# Patient Record
Sex: FEMALE | Race: WHITE | NOT HISPANIC OR LATINO | Employment: OTHER | URBAN - METROPOLITAN AREA
[De-identification: names, ages, dates, MRNs, and addresses within clinical notes are randomized per-mention and may not be internally consistent; named-entity substitution may affect disease eponyms.]

---

## 2017-10-05 PROBLEM — M81.0 AGE-RELATED OSTEOPOROSIS WITHOUT CURRENT PATHOLOGICAL FRACTURE: Status: ACTIVE | Noted: 2017-10-05

## 2017-10-24 PROBLEM — Z12.11 COLON CANCER SCREENING: Status: ACTIVE | Noted: 2017-10-24

## 2018-04-25 ENCOUNTER — ANESTHESIA EVENT (OUTPATIENT)
Dept: SURGERY | Facility: OTHER | Age: 68
DRG: 470 | End: 2018-04-25
Payer: MEDICARE

## 2018-04-25 ENCOUNTER — HOSPITAL ENCOUNTER (OUTPATIENT)
Dept: PREADMISSION TESTING | Facility: OTHER | Age: 68
Discharge: HOME | End: 2018-04-25
Attending: ORTHOPAEDIC SURGERY
Payer: MEDICARE

## 2018-04-25 VITALS
SYSTOLIC BLOOD PRESSURE: 164 MMHG | OXYGEN SATURATION: 99 % | WEIGHT: 159 LBS | BODY MASS INDEX: 30.02 KG/M2 | TEMPERATURE: 97 F | HEART RATE: 64 BPM | HEIGHT: 61 IN | DIASTOLIC BLOOD PRESSURE: 80 MMHG

## 2018-04-25 DIAGNOSIS — M17.11 PRIMARY OSTEOARTHRITIS OF RIGHT KNEE: Primary | ICD-10-CM

## 2018-04-25 LAB
ABO + RH BLD: NORMAL
ALBUMIN SERPL BCP-MCNC: 3.7 G/DL
ALP SERPL-CCNC: 100 U/L
ALT SERPL W/O P-5'-P-CCNC: 18 U/L
ANION GAP SERPL CALC-SCNC: 12 MMOL/L
AST SERPL-CCNC: 27 U/L
BACTERIA #/AREA URNS HPF: ABNORMAL /HPF
BASOPHILS # BLD AUTO: 0.03 K/UL
BASOPHILS NFR BLD: 0.3 %
BILIRUB SERPL-MCNC: 0.7 MG/DL
BILIRUB UR QL STRIP: ABNORMAL
BLD GP AB SCN CELLS X3 SERPL QL: NORMAL
BUN SERPL-MCNC: 17 MG/DL
CALCIUM SERPL-MCNC: 10.1 MG/DL
CHLORIDE SERPL-SCNC: 104 MMOL/L
CLARITY UR: ABNORMAL
CO2 SERPL-SCNC: 24 MMOL/L
COLOR UR: YELLOW
CREAT SERPL-MCNC: 1 MG/DL
DIFFERENTIAL METHOD: ABNORMAL
EOSINOPHIL # BLD AUTO: 0.2 K/UL
EOSINOPHIL NFR BLD: 1.8 %
ERYTHROCYTE [DISTWIDTH] IN BLOOD BY AUTOMATED COUNT: 17.1 %
EST. GFR  (AFRICAN AMERICAN): >60 ML/MIN/1.73 M^2
EST. GFR  (NON AFRICAN AMERICAN): 58 ML/MIN/1.73 M^2
GLUCOSE SERPL-MCNC: 83 MG/DL
GLUCOSE UR QL STRIP: NEGATIVE
HCT VFR BLD AUTO: 38.4 %
HGB BLD-MCNC: 11.7 G/DL
HGB UR QL STRIP: ABNORMAL
HYALINE CASTS #/AREA URNS LPF: 0 /LPF
KETONES UR QL STRIP: ABNORMAL
LEUKOCYTE ESTERASE UR QL STRIP: ABNORMAL
LYMPHOCYTES # BLD AUTO: 2.6 K/UL
LYMPHOCYTES NFR BLD: 24.3 %
MCH RBC QN AUTO: 25.3 PG
MCHC RBC AUTO-ENTMCNC: 30.5 G/DL
MCV RBC AUTO: 83 FL
MICROSCOPIC COMMENT: ABNORMAL
MONOCYTES # BLD AUTO: 0.7 K/UL
MONOCYTES NFR BLD: 6.5 %
NEUTROPHILS # BLD AUTO: 7.2 K/UL
NEUTROPHILS NFR BLD: 66.9 %
NITRITE UR QL STRIP: NEGATIVE
PH UR STRIP: 6 [PH] (ref 5–8)
PLATELET # BLD AUTO: 375 K/UL
PMV BLD AUTO: 10.1 FL
POTASSIUM SERPL-SCNC: 4.1 MMOL/L
PROT SERPL-MCNC: 7.9 G/DL
PROT UR QL STRIP: ABNORMAL
RBC # BLD AUTO: 4.63 M/UL
RBC #/AREA URNS HPF: 20 /HPF (ref 0–4)
SODIUM SERPL-SCNC: 140 MMOL/L
SP GR UR STRIP: >=1.03 (ref 1–1.03)
SQUAMOUS #/AREA URNS HPF: 3 /HPF
URN SPEC COLLECT METH UR: ABNORMAL
UROBILINOGEN UR STRIP-ACNC: NEGATIVE EU/DL
WBC # BLD AUTO: 10.81 K/UL
WBC #/AREA URNS HPF: >100 /HPF (ref 0–5)

## 2018-04-25 PROCEDURE — 87088 URINE BACTERIA CULTURE: CPT

## 2018-04-25 PROCEDURE — 87086 URINE CULTURE/COLONY COUNT: CPT

## 2018-04-25 PROCEDURE — 87077 CULTURE AEROBIC IDENTIFY: CPT

## 2018-04-25 PROCEDURE — 85025 COMPLETE CBC W/AUTO DIFF WBC: CPT

## 2018-04-25 PROCEDURE — 36415 COLL VENOUS BLD VENIPUNCTURE: CPT

## 2018-04-25 PROCEDURE — 87186 SC STD MICRODIL/AGAR DIL: CPT

## 2018-04-25 PROCEDURE — 86901 BLOOD TYPING SEROLOGIC RH(D): CPT

## 2018-04-25 PROCEDURE — 81000 URINALYSIS NONAUTO W/SCOPE: CPT

## 2018-04-25 PROCEDURE — 80053 COMPREHEN METABOLIC PANEL: CPT

## 2018-04-25 RX ORDER — SODIUM CHLORIDE, SODIUM LACTATE, POTASSIUM CHLORIDE, CALCIUM CHLORIDE 600; 310; 30; 20 MG/100ML; MG/100ML; MG/100ML; MG/100ML
INJECTION, SOLUTION INTRAVENOUS CONTINUOUS
Status: CANCELLED | OUTPATIENT
Start: 2018-04-25

## 2018-04-25 RX ORDER — BISOPROLOL FUMARATE 10 MG/1
10 TABLET, FILM COATED ORAL DAILY
COMMUNITY
End: 2020-01-10

## 2018-04-25 RX ORDER — VIT C/E/ZN/COPPR/LUTEIN/ZEAXAN 250MG-90MG
1000 CAPSULE ORAL DAILY
COMMUNITY

## 2018-04-25 RX ORDER — FAMOTIDINE 20 MG/1
20 TABLET, FILM COATED ORAL
Status: CANCELLED | OUTPATIENT
Start: 2018-04-25 | End: 2018-04-25

## 2018-04-25 RX ORDER — PREGABALIN 75 MG/1
150 CAPSULE ORAL
Status: DISCONTINUED | OUTPATIENT
Start: 2018-04-25 | End: 2018-04-26 | Stop reason: HOSPADM

## 2018-04-25 NOTE — ANESTHESIA PREPROCEDURE EVALUATION
04/25/2018  Cheli Moe is a 67 y.o., female.    Anesthesia Evaluation    I have reviewed the Patient Summary Reports.    I have reviewed the Nursing Notes.   I have reviewed the Medications.     Review of Systems  Anesthesia Hx:  PONV  Denies Family Hx of Anesthesia complications.  Personal Hx of Anesthesia complications, Post-Operative Nausea/Vomiting, with every anesthetic, treatment not known   Social:  Non-Smoker    Hematology/Oncology:  Hematology Normal   Oncology Normal     Cardiovascular:   Hypertension, well controlled    Pulmonary:  Pulmonary Normal    Hepatic/GI:  Hepatic/GI Normal    Musculoskeletal:   Arthritis     Neurological:  Neurology Normal    Endocrine:  Endocrine Normal        Physical Exam  General:  Well nourished    Airway/Jaw/Neck:  Airway Findings: Mouth Opening: Normal Tongue: Normal  General Airway Assessment: Adult  Mallampati: II  TM Distance: Normal, at least 6 cm      Dental:  Dental Findings: In tact             Anesthesia Plan  Type of Anesthesia, risks & benefits discussed:  Anesthesia Type:  spinal  Patient's Preference:   Intra-op Monitoring Plan: standard ASA monitors  Intra-op Monitoring Plan Comments:   Post Op Pain Control Plan:   Post Op Pain Control Plan Comments:   Induction:    Beta Blocker:         Informed Consent: Patient understands risks and agrees with Anesthesia plan.  Questions answered. Anesthesia consent signed with patient.  ASA Score: 3     Day of Surgery Review of History & Physical:    H&P update referred to the surgeon.         Ready For Surgery From Anesthesia Perspective.

## 2018-04-25 NOTE — DISCHARGE INSTRUCTIONS
PRE-ADMIT TESTING -  [phone]    [address]  [place] [facility]          Your surgery has been scheduled at [facility]. We are pleased to have the opportunity to serve you. For Further Information please call [phone].    On the day of surgery please report to the Information Desk on the 1st floor.    · CONTACT YOUR PHYSICIAN'S OFFICE THE DAY PRIOR TO YOUR SURGERY TO OBTAIN YOUR ARRIVAL TIME.     · The evening before surgery do not eat anything after 9 p.m. ( this includes hard candy, chewing gum and mints).  You may only have GATORADE, POWERADE AND WATER  from 9 p.m. until you leave your home.   DO NOT DRINK ANY LIQUIDS ON THE WAY TO THE HOSPITAL.      SPECIAL MEDICATION INSTRUCTIONS: TAKE medications checked off by the Anesthesiologist on your Medication List.    Angiogram Patients: Take medications as instructed by your physician, including aspirin.     Surgery Patients:    If you take ASPIRIN - Your PHYSICIAN/SURGEON will need to inform you IF/OR when you need to stop taking aspirin prior to your surgery.     Do Not take any medications containing IBUPROFEN.  Do Not Wear any make-up or dark nail polish   (especially eye make-up) to surgery. If you come to surgery with makeup on you will be required to remove the makeup or nail polish.    Do not shave your surgical area at least 5 days prior to your surgery. The surgical prep will be performed at the hospital according to Infection Control regulations.    Leave all valuables at home.   Do Not wear any jewelry or watches, including any metal in body piercings.  Contact Lens must be removed before surgery. Either do not wear the contact lens or bring a case and solution for storage.  Please bring a container for eyeglasses or dentures as required.  Bring any paperwork your physician has provided, such as consent forms,  history and physicals, doctor's orders, etc.   Bring comfortable clothes that are loose fitting to wear upon  discharge. Take into consideration the type of surgery being performed.  Maintain your diet as advised per your physician the day prior to surgery.      Adequate rest the night before surgery is advised.   Park in the Parking lot behind the hospital or in the [facility] across the street from the parking lot. Parking is complimentary.  If you will be discharged the same day as your procedure, please arrange for a responsible adult to drive you home or to accompany you if traveling by taxi.   YOU WILL NOT BE PERMITTED TO DRIVE OR TO LEAVE THE HOSPITAL ALONE AFTER SURGERY.   It is strongly recommended that you arrange for someone to remain with you for the first 24 hrs following your surgery.       Thank you for your cooperation.  The Staff of [facility].        Bathing Instructions                                                                 Please shower the evening before and morning of your procedure with    ANTIBACTERIAL SOAP. ( DIAL, etc )  Concentrate on the surgical area   for at least 3 minutes and rinse completely. Dry off as usual.   Do not use any deodorant, powder, body lotions, perfume, after shave or    cologne.

## 2018-04-27 LAB — BACTERIA UR CULT: NORMAL

## 2018-04-27 NOTE — PRE ADMISSION SCREENING
Left voicemail for Kelsey at Dr. Reaves's office regarding preliminary urine culture result, since patient surgery scheduled Monday, 4/30/18

## 2018-04-27 NOTE — PRE ADMISSION SCREENING
"Received outside cardiac preop risk assessment ("moderate risk") and PET stress result.  Kelsey notified.  Kelsey to contact patient to Rx antibiotics for urine result. Patient's  notified. Will proceed with surgery on 4/30/18.  "

## 2018-04-30 ENCOUNTER — ANESTHESIA (OUTPATIENT)
Dept: SURGERY | Facility: OTHER | Age: 68
DRG: 470 | End: 2018-04-30
Payer: MEDICARE

## 2018-04-30 ENCOUNTER — HOSPITAL ENCOUNTER (INPATIENT)
Facility: OTHER | Age: 68
LOS: 1 days | Discharge: HOME HEALTH | DRG: 470 | End: 2018-05-01
Attending: ORTHOPAEDIC SURGERY | Admitting: ORTHOPAEDIC SURGERY
Payer: MEDICARE

## 2018-04-30 DIAGNOSIS — M17.11 PRIMARY OSTEOARTHRITIS OF RIGHT KNEE: Primary | ICD-10-CM

## 2018-04-30 PROCEDURE — 27201423 OPTIME MED/SURG SUP & DEVICES STERILE SUPPLY: Performed by: ORTHOPAEDIC SURGERY

## 2018-04-30 PROCEDURE — 94799 UNLISTED PULMONARY SVC/PX: CPT

## 2018-04-30 PROCEDURE — 94761 N-INVAS EAR/PLS OXIMETRY MLT: CPT

## 2018-04-30 PROCEDURE — 71000033 HC RECOVERY, INTIAL HOUR: Performed by: ORTHOPAEDIC SURGERY

## 2018-04-30 PROCEDURE — 25000003 PHARM REV CODE 250: Performed by: NURSE PRACTITIONER

## 2018-04-30 PROCEDURE — 25000003 PHARM REV CODE 250: Performed by: ORTHOPAEDIC SURGERY

## 2018-04-30 PROCEDURE — 97161 PT EVAL LOW COMPLEX 20 MIN: CPT

## 2018-04-30 PROCEDURE — 63600175 PHARM REV CODE 636 W HCPCS: Performed by: ORTHOPAEDIC SURGERY

## 2018-04-30 PROCEDURE — 37000009 HC ANESTHESIA EA ADD 15 MINS: Performed by: ORTHOPAEDIC SURGERY

## 2018-04-30 PROCEDURE — 37000008 HC ANESTHESIA 1ST 15 MINUTES: Performed by: ORTHOPAEDIC SURGERY

## 2018-04-30 PROCEDURE — 25000003 PHARM REV CODE 250: Performed by: NURSE ANESTHETIST, CERTIFIED REGISTERED

## 2018-04-30 PROCEDURE — 25000003 PHARM REV CODE 250: Performed by: ANESTHESIOLOGY

## 2018-04-30 PROCEDURE — 97110 THERAPEUTIC EXERCISES: CPT

## 2018-04-30 PROCEDURE — C9290 INJ, BUPIVACAINE LIPOSOME: HCPCS | Performed by: ORTHOPAEDIC SURGERY

## 2018-04-30 PROCEDURE — 63600175 PHARM REV CODE 636 W HCPCS: Performed by: ANESTHESIOLOGY

## 2018-04-30 PROCEDURE — 63600175 PHARM REV CODE 636 W HCPCS: Performed by: NURSE ANESTHETIST, CERTIFIED REGISTERED

## 2018-04-30 PROCEDURE — 63600175 PHARM REV CODE 636 W HCPCS

## 2018-04-30 PROCEDURE — 36000711: Performed by: ORTHOPAEDIC SURGERY

## 2018-04-30 PROCEDURE — 0SRC0J9 REPLACEMENT OF RIGHT KNEE JOINT WITH SYNTHETIC SUBSTITUTE, CEMENTED, OPEN APPROACH: ICD-10-PCS | Performed by: ORTHOPAEDIC SURGERY

## 2018-04-30 PROCEDURE — C1729 CATH, DRAINAGE: HCPCS | Performed by: ORTHOPAEDIC SURGERY

## 2018-04-30 PROCEDURE — 71000039 HC RECOVERY, EACH ADD'L HOUR: Performed by: ORTHOPAEDIC SURGERY

## 2018-04-30 PROCEDURE — G8978 MOBILITY CURRENT STATUS: HCPCS | Mod: CK

## 2018-04-30 PROCEDURE — G8979 MOBILITY GOAL STATUS: HCPCS | Mod: CJ

## 2018-04-30 PROCEDURE — 25000003 PHARM REV CODE 250

## 2018-04-30 PROCEDURE — 11000001 HC ACUTE MED/SURG PRIVATE ROOM

## 2018-04-30 PROCEDURE — 97116 GAIT TRAINING THERAPY: CPT

## 2018-04-30 PROCEDURE — C1713 ANCHOR/SCREW BN/BN,TIS/BN: HCPCS | Performed by: ORTHOPAEDIC SURGERY

## 2018-04-30 PROCEDURE — C1776 JOINT DEVICE (IMPLANTABLE): HCPCS | Performed by: ORTHOPAEDIC SURGERY

## 2018-04-30 PROCEDURE — 8E0YXBZ COMPUTER ASSISTED PROCEDURE OF LOWER EXTREMITY: ICD-10-PCS | Performed by: ORTHOPAEDIC SURGERY

## 2018-04-30 PROCEDURE — 36000710: Performed by: ORTHOPAEDIC SURGERY

## 2018-04-30 DEVICE — CEMENT BONE RDPQ 40G PDR 20ML: Type: IMPLANTABLE DEVICE | Site: KNEE | Status: FUNCTIONAL

## 2018-04-30 DEVICE — PSN TIB STM 5 DEG SZ D R: Type: IMPLANTABLE DEVICE | Site: KNEE | Status: FUNCTIONAL

## 2018-04-30 DEVICE — PATELLA NEXGEN ALL-POLY: Type: IMPLANTABLE DEVICE | Site: KNEE | Status: FUNCTIONAL

## 2018-04-30 DEVICE — IMPLANTABLE DEVICE: Type: IMPLANTABLE DEVICE | Site: KNEE | Status: FUNCTIONAL

## 2018-04-30 DEVICE — COMPONENT PERSONA CR SZ6 RT: Type: IMPLANTABLE DEVICE | Site: KNEE | Status: FUNCTIONAL

## 2018-04-30 RX ORDER — VANCOMYCIN HCL IN 5 % DEXTROSE 1G/250ML
15 PLASTIC BAG, INJECTION (ML) INTRAVENOUS
Status: DISCONTINUED | OUTPATIENT
Start: 2018-04-30 | End: 2018-04-30

## 2018-04-30 RX ORDER — HYDROMORPHONE HYDROCHLORIDE 1 MG/ML
0.5 INJECTION, SOLUTION INTRAMUSCULAR; INTRAVENOUS; SUBCUTANEOUS EVERY 4 HOURS PRN
Status: ACTIVE | OUTPATIENT
Start: 2018-04-30 | End: 2018-05-01

## 2018-04-30 RX ORDER — CELECOXIB 200 MG/1
200 CAPSULE ORAL 2 TIMES DAILY
Status: DISCONTINUED | OUTPATIENT
Start: 2018-04-30 | End: 2018-04-30

## 2018-04-30 RX ORDER — BACITRACIN 50000 [IU]/1
INJECTION, POWDER, FOR SOLUTION INTRAMUSCULAR
Status: DISCONTINUED | OUTPATIENT
Start: 2018-04-30 | End: 2018-04-30 | Stop reason: HOSPADM

## 2018-04-30 RX ORDER — BUPIVACAINE HYDROCHLORIDE 2.5 MG/ML
INJECTION, SOLUTION EPIDURAL; INFILTRATION; INTRACAUDAL
Status: DISCONTINUED | OUTPATIENT
Start: 2018-04-30 | End: 2018-04-30 | Stop reason: HOSPADM

## 2018-04-30 RX ORDER — DIPHENHYDRAMINE HYDROCHLORIDE 50 MG/ML
25 INJECTION INTRAMUSCULAR; INTRAVENOUS EVERY 8 HOURS PRN
Status: DISCONTINUED | OUTPATIENT
Start: 2018-04-30 | End: 2018-05-01 | Stop reason: HOSPADM

## 2018-04-30 RX ORDER — KETOROLAC TROMETHAMINE 30 MG/ML
INJECTION, SOLUTION INTRAMUSCULAR; INTRAVENOUS
Status: DISCONTINUED | OUTPATIENT
Start: 2018-04-30 | End: 2018-04-30 | Stop reason: HOSPADM

## 2018-04-30 RX ORDER — DEXTROSE MONOHYDRATE AND SODIUM CHLORIDE 5; .9 G/100ML; G/100ML
INJECTION, SOLUTION INTRAVENOUS CONTINUOUS
Status: DISCONTINUED | OUTPATIENT
Start: 2018-04-30 | End: 2018-05-01 | Stop reason: HOSPADM

## 2018-04-30 RX ORDER — FAMOTIDINE 20 MG/1
20 TABLET, FILM COATED ORAL 2 TIMES DAILY
Status: DISCONTINUED | OUTPATIENT
Start: 2018-04-30 | End: 2018-05-01 | Stop reason: HOSPADM

## 2018-04-30 RX ORDER — LIDOCAINE HCL/PF 100 MG/5ML
SYRINGE (ML) INTRAVENOUS
Status: DISCONTINUED | OUTPATIENT
Start: 2018-04-30 | End: 2018-04-30

## 2018-04-30 RX ORDER — CEFAZOLIN SODIUM 1 G/3ML
2 INJECTION, POWDER, FOR SOLUTION INTRAMUSCULAR; INTRAVENOUS
Status: COMPLETED | OUTPATIENT
Start: 2018-04-30 | End: 2018-04-30

## 2018-04-30 RX ORDER — FENTANYL CITRATE 50 UG/ML
25 INJECTION, SOLUTION INTRAMUSCULAR; INTRAVENOUS EVERY 5 MIN PRN
Status: DISCONTINUED | OUTPATIENT
Start: 2018-04-30 | End: 2018-04-30 | Stop reason: HOSPADM

## 2018-04-30 RX ORDER — ACETAMINOPHEN 10 MG/ML
1000 INJECTION, SOLUTION INTRAVENOUS
Status: COMPLETED | OUTPATIENT
Start: 2018-04-30 | End: 2018-04-30

## 2018-04-30 RX ORDER — SODIUM CHLORIDE 0.9 % (FLUSH) 0.9 %
5 SYRINGE (ML) INJECTION
Status: DISCONTINUED | OUTPATIENT
Start: 2018-04-30 | End: 2018-05-01 | Stop reason: HOSPADM

## 2018-04-30 RX ORDER — VANCOMYCIN HCL IN 5 % DEXTROSE 1G/250ML
15 PLASTIC BAG, INJECTION (ML) INTRAVENOUS
Status: COMPLETED | OUTPATIENT
Start: 2018-04-30 | End: 2018-04-30

## 2018-04-30 RX ORDER — MIDAZOLAM HYDROCHLORIDE 1 MG/ML
INJECTION, SOLUTION INTRAMUSCULAR; INTRAVENOUS
Status: DISCONTINUED | OUTPATIENT
Start: 2018-04-30 | End: 2018-04-30

## 2018-04-30 RX ORDER — OXYCODONE HYDROCHLORIDE 5 MG/1
5 TABLET ORAL
Status: DISCONTINUED | OUTPATIENT
Start: 2018-04-30 | End: 2018-04-30 | Stop reason: HOSPADM

## 2018-04-30 RX ORDER — PROPOFOL 10 MG/ML
VIAL (ML) INTRAVENOUS CONTINUOUS PRN
Status: DISCONTINUED | OUTPATIENT
Start: 2018-04-30 | End: 2018-04-30

## 2018-04-30 RX ORDER — SODIUM CHLORIDE, SODIUM LACTATE, POTASSIUM CHLORIDE, CALCIUM CHLORIDE 600; 310; 30; 20 MG/100ML; MG/100ML; MG/100ML; MG/100ML
INJECTION, SOLUTION INTRAVENOUS CONTINUOUS
Status: DISCONTINUED | OUTPATIENT
Start: 2018-04-30 | End: 2018-04-30

## 2018-04-30 RX ORDER — CEFAZOLIN SODIUM 2 G/50ML
2 SOLUTION INTRAVENOUS
Status: COMPLETED | OUTPATIENT
Start: 2018-04-30 | End: 2018-04-30

## 2018-04-30 RX ORDER — MUPIROCIN 20 MG/G
1 OINTMENT TOPICAL 2 TIMES DAILY
Status: DISCONTINUED | OUTPATIENT
Start: 2018-04-30 | End: 2018-05-01 | Stop reason: HOSPADM

## 2018-04-30 RX ORDER — BISOPROLOL FUMARATE 5 MG/1
10 TABLET, FILM COATED ORAL DAILY
Status: DISCONTINUED | OUTPATIENT
Start: 2018-04-30 | End: 2018-05-01 | Stop reason: HOSPADM

## 2018-04-30 RX ORDER — FAMOTIDINE 20 MG/1
20 TABLET, FILM COATED ORAL
Status: COMPLETED | OUTPATIENT
Start: 2018-04-30 | End: 2018-04-30

## 2018-04-30 RX ORDER — SODIUM CHLORIDE 0.9 % (FLUSH) 0.9 %
3 SYRINGE (ML) INJECTION
Status: DISCONTINUED | OUTPATIENT
Start: 2018-04-30 | End: 2018-05-01 | Stop reason: HOSPADM

## 2018-04-30 RX ORDER — HYDROMORPHONE HYDROCHLORIDE 2 MG/ML
0.4 INJECTION, SOLUTION INTRAMUSCULAR; INTRAVENOUS; SUBCUTANEOUS EVERY 5 MIN PRN
Status: DISCONTINUED | OUTPATIENT
Start: 2018-04-30 | End: 2018-04-30 | Stop reason: HOSPADM

## 2018-04-30 RX ORDER — ONDANSETRON 2 MG/ML
4 INJECTION INTRAMUSCULAR; INTRAVENOUS DAILY PRN
Status: DISCONTINUED | OUTPATIENT
Start: 2018-04-30 | End: 2018-04-30 | Stop reason: HOSPADM

## 2018-04-30 RX ORDER — SERTRALINE HYDROCHLORIDE 50 MG/1
50 TABLET, FILM COATED ORAL NIGHTLY
Status: DISCONTINUED | OUTPATIENT
Start: 2018-04-30 | End: 2018-05-01 | Stop reason: HOSPADM

## 2018-04-30 RX ORDER — ONDANSETRON 2 MG/ML
4 INJECTION INTRAMUSCULAR; INTRAVENOUS EVERY 12 HOURS PRN
Status: DISCONTINUED | OUTPATIENT
Start: 2018-04-30 | End: 2018-05-01 | Stop reason: HOSPADM

## 2018-04-30 RX ORDER — FOLIC ACID 1 MG/1
1000 TABLET ORAL DAILY
Status: DISCONTINUED | OUTPATIENT
Start: 2018-04-30 | End: 2018-05-01 | Stop reason: HOSPADM

## 2018-04-30 RX ORDER — MEPERIDINE HYDROCHLORIDE 50 MG/ML
12.5 INJECTION INTRAMUSCULAR; INTRAVENOUS; SUBCUTANEOUS ONCE AS NEEDED
Status: COMPLETED | OUTPATIENT
Start: 2018-04-30 | End: 2018-04-30

## 2018-04-30 RX ORDER — OXYCODONE HYDROCHLORIDE 5 MG/1
10 TABLET ORAL EVERY 4 HOURS PRN
Status: DISCONTINUED | OUTPATIENT
Start: 2018-04-30 | End: 2018-05-01 | Stop reason: HOSPADM

## 2018-04-30 RX ORDER — HYDRALAZINE HYDROCHLORIDE 20 MG/ML
10 INJECTION INTRAMUSCULAR; INTRAVENOUS EVERY 8 HOURS PRN
Status: DISCONTINUED | OUTPATIENT
Start: 2018-04-30 | End: 2018-05-01 | Stop reason: HOSPADM

## 2018-04-30 RX ORDER — PROPOFOL 10 MG/ML
VIAL (ML) INTRAVENOUS
Status: DISCONTINUED | OUTPATIENT
Start: 2018-04-30 | End: 2018-04-30

## 2018-04-30 RX ORDER — ACETAMINOPHEN 10 MG/ML
1000 INJECTION, SOLUTION INTRAVENOUS EVERY 8 HOURS
Status: COMPLETED | OUTPATIENT
Start: 2018-04-30 | End: 2018-05-01

## 2018-04-30 RX ORDER — ROPIVACAINE HYDROCHLORIDE 5 MG/ML
INJECTION, SOLUTION EPIDURAL; INFILTRATION; PERINEURAL
Status: DISCONTINUED | OUTPATIENT
Start: 2018-04-30 | End: 2018-04-30

## 2018-04-30 RX ORDER — ASPIRIN 325 MG
325 TABLET ORAL EVERY 12 HOURS
Status: DISCONTINUED | OUTPATIENT
Start: 2018-05-01 | End: 2018-05-01 | Stop reason: HOSPADM

## 2018-04-30 RX ORDER — OXYCODONE HYDROCHLORIDE 5 MG/1
5 TABLET ORAL EVERY 4 HOURS PRN
Status: DISCONTINUED | OUTPATIENT
Start: 2018-04-30 | End: 2018-05-01 | Stop reason: HOSPADM

## 2018-04-30 RX ORDER — ONDANSETRON HYDROCHLORIDE 2 MG/ML
INJECTION, SOLUTION INTRAMUSCULAR; INTRAVENOUS
Status: DISCONTINUED | OUTPATIENT
Start: 2018-04-30 | End: 2018-04-30

## 2018-04-30 RX ORDER — POLYETHYLENE GLYCOL 3350 17 G/17G
17 POWDER, FOR SOLUTION ORAL DAILY
Status: DISCONTINUED | OUTPATIENT
Start: 2018-05-01 | End: 2018-05-01 | Stop reason: HOSPADM

## 2018-04-30 RX ORDER — TRANEXAMIC ACID 100 MG/ML
INJECTION, SOLUTION INTRAVENOUS
Status: DISCONTINUED | OUTPATIENT
Start: 2018-04-30 | End: 2018-04-30

## 2018-04-30 RX ORDER — DOCUSATE SODIUM 100 MG/1
100 CAPSULE, LIQUID FILLED ORAL EVERY 12 HOURS
Status: DISCONTINUED | OUTPATIENT
Start: 2018-04-30 | End: 2018-05-01 | Stop reason: HOSPADM

## 2018-04-30 RX ORDER — DIPHENHYDRAMINE HYDROCHLORIDE 50 MG/ML
25 INJECTION INTRAMUSCULAR; INTRAVENOUS EVERY 6 HOURS PRN
Status: DISCONTINUED | OUTPATIENT
Start: 2018-04-30 | End: 2018-04-30 | Stop reason: HOSPADM

## 2018-04-30 RX ADMIN — VANCOMYCIN HYDROCHLORIDE 1000 MG: 1 INJECTION, POWDER, LYOPHILIZED, FOR SOLUTION INTRAVENOUS at 06:04

## 2018-04-30 RX ADMIN — FAMOTIDINE 20 MG: 20 TABLET, FILM COATED ORAL at 08:04

## 2018-04-30 RX ADMIN — ACETAMINOPHEN 1000 MG: 10 INJECTION, SOLUTION INTRAVENOUS at 01:04

## 2018-04-30 RX ADMIN — PROPOFOL 50 MCG/KG/MIN: 10 INJECTION, EMULSION INTRAVENOUS at 08:04

## 2018-04-30 RX ADMIN — FOLIC ACID 1000 MCG: 1 TABLET ORAL at 03:04

## 2018-04-30 RX ADMIN — ONDANSETRON 4 MG: 2 INJECTION, SOLUTION INTRAMUSCULAR; INTRAVENOUS at 08:04

## 2018-04-30 RX ADMIN — VANCOMYCIN HYDROCHLORIDE 1000 MG: 1 INJECTION, POWDER, LYOPHILIZED, FOR SOLUTION INTRAVENOUS at 07:04

## 2018-04-30 RX ADMIN — MUPIROCIN 1 G: 20 OINTMENT TOPICAL at 08:04

## 2018-04-30 RX ADMIN — FAMOTIDINE 20 MG: 20 TABLET ORAL at 07:04

## 2018-04-30 RX ADMIN — ACETAMINOPHEN 1000 MG: 10 INJECTION, SOLUTION INTRAVENOUS at 08:04

## 2018-04-30 RX ADMIN — TRANEXAMIC ACID 700 MG: 100 INJECTION, SOLUTION INTRAVENOUS at 08:04

## 2018-04-30 RX ADMIN — SODIUM CHLORIDE, SODIUM LACTATE, POTASSIUM CHLORIDE, AND CALCIUM CHLORIDE: 600; 310; 30; 20 INJECTION, SOLUTION INTRAVENOUS at 09:04

## 2018-04-30 RX ADMIN — SODIUM CHLORIDE, SODIUM LACTATE, POTASSIUM CHLORIDE, AND CALCIUM CHLORIDE: 600; 310; 30; 20 INJECTION, SOLUTION INTRAVENOUS at 08:04

## 2018-04-30 RX ADMIN — DOCUSATE SODIUM 100 MG: 100 CAPSULE, LIQUID FILLED ORAL at 08:04

## 2018-04-30 RX ADMIN — OXYCODONE HYDROCHLORIDE 5 MG: 5 TABLET ORAL at 12:04

## 2018-04-30 RX ADMIN — LIDOCAINE HYDROCHLORIDE 75 MG: 20 INJECTION, SOLUTION INTRAVENOUS at 08:04

## 2018-04-30 RX ADMIN — CEFAZOLIN SODIUM 2 G: 2 SOLUTION INTRAVENOUS at 11:04

## 2018-04-30 RX ADMIN — CEFAZOLIN SODIUM 2 G: 2 SOLUTION INTRAVENOUS at 03:04

## 2018-04-30 RX ADMIN — MEPERIDINE HYDROCHLORIDE 12.5 MG: 50 INJECTION INTRAMUSCULAR; INTRAVENOUS; SUBCUTANEOUS at 10:04

## 2018-04-30 RX ADMIN — OXYCODONE HYDROCHLORIDE 10 MG: 5 TABLET ORAL at 02:04

## 2018-04-30 RX ADMIN — SERTRALINE HYDROCHLORIDE 50 MG: 50 TABLET ORAL at 08:04

## 2018-04-30 RX ADMIN — ONDANSETRON HYDROCHLORIDE 4 MG: 2 INJECTION, SOLUTION INTRAMUSCULAR; INTRAVENOUS at 03:04

## 2018-04-30 RX ADMIN — PROPOFOL 20 MG: 10 INJECTION, EMULSION INTRAVENOUS at 08:04

## 2018-04-30 RX ADMIN — TRANEXAMIC ACID 700 MG: 100 INJECTION, SOLUTION INTRAVENOUS at 09:04

## 2018-04-30 RX ADMIN — OXYCODONE HYDROCHLORIDE 5 MG: 5 TABLET ORAL at 06:04

## 2018-04-30 RX ADMIN — SODIUM CHLORIDE, SODIUM LACTATE, POTASSIUM CHLORIDE, AND CALCIUM CHLORIDE: 600; 310; 30; 20 INJECTION, SOLUTION INTRAVENOUS at 07:04

## 2018-04-30 RX ADMIN — MIDAZOLAM 2 MG: 1 INJECTION INTRAMUSCULAR; INTRAVENOUS at 08:04

## 2018-04-30 RX ADMIN — ACETAMINOPHEN 1000 MG: 10 INJECTION, SOLUTION INTRAVENOUS at 09:04

## 2018-04-30 RX ADMIN — DEXTROSE AND SODIUM CHLORIDE: 5; .9 INJECTION, SOLUTION INTRAVENOUS at 01:04

## 2018-04-30 RX ADMIN — CEFAZOLIN 2 G: 330 INJECTION, POWDER, FOR SOLUTION INTRAMUSCULAR; INTRAVENOUS at 08:04

## 2018-04-30 RX ADMIN — ROPIVACAINE HYDROCHLORIDE 3 ML: 5 INJECTION, SOLUTION EPIDURAL; INFILTRATION; PERINEURAL at 08:04

## 2018-04-30 NOTE — TRANSFER OF CARE
"Anesthesia Transfer of Care Note    Patient: Cheli Moe    Procedure(s) Performed: Procedure(s) (LRB):  REPLACEMENT-KNEE WITH NAVIGATION (Right)    Patient location: PACU    Anesthesia Type: spinal    Transport from OR: Transported from OR on room air with adequate spontaneous ventilation    Post pain: adequate analgesia    Post assessment: no apparent anesthetic complications    Post vital signs: stable    Level of consciousness: awake and alert    Nausea/Vomiting: no nausea/vomiting    Complications: none    Transfer of care protocol was followed      Last vitals:   Visit Vitals  BP (!) 189/80 (BP Location: Left arm, Patient Position: Sitting)   Pulse (!) 56   Temp 36.4 °C (97.5 °F) (Oral)   Resp 16   Ht 5' 1" (1.549 m)   Wt 72.1 kg (159 lb)   SpO2 96%   Breastfeeding? No   BMI 30.04 kg/m²     "

## 2018-04-30 NOTE — PROGRESS NOTES
Pt received on RA with adequate saturation. Instructed and educated pt on use of IS;pt understood. Pt achieved 1500. Will continue to monitor.

## 2018-04-30 NOTE — PT/OT/SLP EVAL
"Physical Therapy Evaluation and Treatment     Patient Name:  Cheli Moe   MRN:  [id number]    Recommendations:     Discharge Recommendations:  home with home health, home health PT, home health OT   Discharge Equipment Recommendations: none   Barriers to discharge: None    Assessment:     Cheli Moe is a 67 y.o. female admitted with a medical diagnosis of <principal problem not specified>.  She presents with the following impairments/functional limitations:  weakness, decreased safety awareness, pain, impaired functional mobilty, decreased lower extremity function, decreased ROM, gait instability. PT evaluation completed. Pt tolerated initial eval well without complaints.     Rehab Prognosis:  Good; patient would benefit from acute skilled PT services to address these deficits and reach maximum level of function.      Recent Surgery: Procedure(s) (LRB):  REPLACEMENT-KNEE WITH NAVIGATION (Right) Day of Surgery    Plan:     During this hospitalization, patient to be seen BID to address the above listed problems via gait training, therapeutic activities, therapeutic exercises, neuromuscular re-education  · Plan of Care Expires:  05/30/18   Plan of Care Reviewed with:      Subjective     Communicated with RN prior to session.  Patient found Supine upon PT entry to room, agreeable to evaluation.      Chief Complaint: mild posterior R knee pain  Patient comments/goals: "I want to be able to visit my sister in [place] soon because she is not doing well"  Pain/Comfort:  · Pain Rating 1: 1/10  · Location - Side 1: Right  · Location - Orientation 1: posterior  · Location 1: knee  · Pain Addressed 1: Reposition, Distraction  · Pain Rating Post-Intervention 1: 3/10    Patients cultural, spiritual, Methodist conflicts given the current situation: none specified    Living Environment:  Pt lives with her  and 2 daughters (one daughter is "special needs" and requires aides for assistance). They live in a 1 " "story house with 1 small FRANSISCA.   Prior to admission, patients level of function was mod (I) with using a RW for gait and mod (I) for toileting using a BSC and a tub transfer bench for bathing mod (I). She endorses a fall "a couple of months ago" without injury.  Patient has the following equipment: 3-in-1 commode, walker, rolling  And transfer tub bench.  DME owned (not currently used): wheelchair and quad cane.  Upon discharge, patient will have assistance from .    Objective:     Patient found with: peripheral IV, pepe catheter     General Precautions: Standard, fall   Orthopedic Precautions:Full weight bearing   Braces: N/A     Exams:  · Cognition: Patient is oriented to Person, Place, Time and Situation and follows approximately 100% of one step commands.    · Posture:    · -       Rounded shoulders  · -       Forward head  · Sensation: Intact to light touch bilateral LEs.   · Skin Integrity: Visible skin intact  · Edema: None noted   · Coordination: No coordination impairments identified with functional mobility. No formal testing performed.   · LE ROM/Strength: RLE: ankle DF: 4, good quad contraction, hip flexion: 3  · Tone: No tone impairments identified during functional mobility.     Functional Mobility:  · Bed Mobility:     · Supine to Sit: stand by assistance with HOB elevated  · Transfers:     · Sit to Stand:  contact guard assistance with rolling walker x 1 from EOB, pt required verbal cues for hand placement and safety   · Gait: 1x 100' with RW and min A for walker management progressing to SBA. pt required verbal cues for increased step length and heel-toe gait pattern    AM-PAC 6 CLICK MOBILITY  Total Score:18     Patient left up in chair with all lines intact, call button in reach, RN notified and BLE reclined.    GOALS:    Physical Therapy Goals        Problem: Physical Therapy Goal    Goal Priority Disciplines Outcome Goal Variances Interventions   Physical Therapy Goal     PT/OT, PT " "Ongoing (interventions implemented as appropriate)     Description:  Goals to be met by: 5/15/18     Patient will increase functional independence with mobility by performin. Supine to sit with supervision.   2. Sit to supine with supervision.   3. Sit<>stand transfer with supervision using rolling walker.   4. Gait > 150 feet with SBA using rolling walker.   5. Ascend/descend 6" curb step with RW and SBA                    History:     Past Medical History:   Diagnosis Date    Anxiety     Arthritis     RA    Cancer     skin cancer    Cholelithiasis     Diverticulitis     Diverticulosis     Elevated LFTs     Encounter for blood transfusion     Hemorrhoids     History of colon polyps     Hyperlipidemia     Hypertension     Osteoporosis     PONV (postoperative nausea and vomiting)     Reflux     Thyroid disease     hypothyroidism       Past Surgical History:   Procedure Laterality Date    cholecystectomy      CHOLECYSTECTOMY      COLONOSCOPY N/A 10/24/2017    Procedure: SCREENING COLONOSCOPY;  Surgeon: Rodrigue Parker MD;  [facility];  Service: Endoscopy;  Laterality: N/A;    COLONOSCOPY W/ POLYPECTOMY      EYE SURGERY Right     cataract    FRACTURE SURGERY Right     knee    HERNIA REPAIR      stomach    HIATAL HERNIA REPAIR      removal of skin cancer      THYROIDECTOMY, PARTIAL      TONSILLECTOMY      TRACHEAL SURGERY      in [facility] 2months after hernia repair       Clinical Decision Making:     History  Co-morbidities and personal factors that may impact the plan of care Examination  Body Structures and Functions, activity limitations and participation restrictions that may impact the plan of care Clinical Presentation   Decision Making/ Complexity Score   Co-morbidities:   [] Time since onset of injury / illness / exacerbation  [] Status of current condition  []Patient's cognitive status and safety concerns    [] Multiple Medical Problems (see med " hx)  Personal Factors:   [] Patient's age  [] Prior Level of function   [] Patient's home situation (environment and family support)  [] Patient's level of motivation  [] Expected progression of patient      HISTORY:(criteria)    [] 89213 - no personal factors/history    [] 93555 - has 1-2 personal factor/comorbidity     [] 25460 - has >3 personal factor/comorbidity     Body Regions:  [] Objective examination findings  [] Head     []  Neck  [] Trunk   [] Upper Extremity  [] Lower Extremity    Body Systems:  [] For communication ability, affect, cognition, language, and learning style: the assessment of the ability to make needs known, consciousness, orientation (person, place, and time), expected emotional /behavioral responses, and learning preferences (eg, learning barriers, education  needs)  [] For the neuromuscular system: a general assessment of gross coordinated movement (eg, balance, gait, locomotion, transfers, and transitions) and motor function  (motor control and motor learning)  [] For the musculoskeletal system: the assessment of gross symmetry, gross range of motion, gross strength, height, and weight  [] For the integumentary system: the assessment of pliability(texture), presence of scar formation, skin color, and skin integrity  [] For cardiovascular/pulmonary system: the assessment of heart rate, respiratory rate, blood pressure, and edema     Activity limitations:    [] Patient's cognitive status and saf ety concerns          [] Status of current condition      [] Weight bearing restriction  [] Cardiopulmunary Restriction    Participation Restrictions:   [] Goals and goal agreement with the patient     [] Rehab potential (prognosis) and probable outcome      Examination of Body System: (criteria)    [] 69908 - addressing 1-2 elements    [] 79861 - addressing a total of 3 or more elements     [] 81845 -  Addressing a total of 4 or more elements         Clinical Presentation: (criteria)  Choose  one     On examination of body system using standardized tests and measures patient presents with (CHOOSE ONE) elements from any of the following: body structures and functions, activity limitations, and/or participation restrictions.  Leading to a clinical presentation that is considered (CHOOSE ONE)                              Clinical Decision Making  (Eval Complexity):  Choose One     Time Tracking:     PT Received On: 04/30/18  PT Start Time: 1308     PT Stop Time: 1330  PT Total Time (min): 22 min     Billable Minutes: Evaluation 12 and Gait Training 10     Purvi Mejia, PT, DPT  04/30/2018

## 2018-04-30 NOTE — ANESTHESIA PROCEDURE NOTES
Spinal    Diagnosis: DJD knee  Patient location during procedure: holding area  Timeout: 4/30/2018 8:23 AM  Staffing  Anesthesiologist: LUCHO JAIN  Preanesthetic Checklist  Completed: patient identified, site marked, surgical consent, pre-op evaluation, timeout performed, IV checked, risks and benefits discussed and monitors and equipment checked  Spinal Block  Patient position: sitting  Prep: ChloraPrep  Patient monitoring: heart rate, cardiac monitor and continuous pulse ox  Injection technique: single shot  Medications:  Bolus administered: 3 mL of 0.5 ropivacaine  Epinephrine added: none

## 2018-04-30 NOTE — PLAN OF CARE
04/30/18 1314   Final Note   Assessment Type Final Discharge Note   Discharge Disposition Home-Health   What phone number can be called within the next 1-3 days to see how you are doing after discharge? [phone]   Discharge plans and expectations educations in teach back method with documentation complete? Yes   Right Care Referral Info   Post Acute Recommendation Home-care   Facility Name [facility]

## 2018-04-30 NOTE — PLAN OF CARE
[facility]       [address]       [place] [address]       [phone]               [facility] Orthopedic Discharge Orders    Home Jet           Expected Discharge Date: 5/1    Diagnoses:  Post-op  knee replacement    Patient is homebound due to:   Pt requires home health services due to taxing effort to leave the home as a result of immobility from Post-op knee replacement      Weight Bearing Status:   full weight bearing: right leg    CPM: for 3 weeks (2 hours in the morning, 2 hours in the evening); increase by 5 degrees each day until maximum amount then continue to use at the maximum degrees.    TKR:  CPM use should total at least 4-6 hours daily. Start CPM setting around the PROM measurement at Eval.  Progress 5 degrees daily as tolerated until max setting is achieved.  Continue CPM use for 3 weeks post-op then CPM provider [facility] will contact you for CPM pickup.     Physical Therapy   3 times a week   - Ambulate with a rolling walker  - Progress to cane  - Instruct on ROM and strengthening of knee    Aide to provide assistance with personal care and  ADLs  3 times a week    Wound Care:   If patient is discharged with aqua johnathan/silver dressing, leave on for 5 days unless saturated border to border, then follow instructions below:  Cleanse with wound cleanser or normal saline and apply Mepore Pro dressing.  If Mepore pro not available apply gauze and tegaderm.  Change 3 times a week or PRN if dry.  Teach patient to change daily if draining.        Contact:  Please contact the nurse practitionerKelsey at [phone] at Ext [phone]. with concerns.  She is in surgery M,W,F so if urgent and needs to be addressed prior to the end of the day call the  and they with contact her in the OR or Clinic.         BLOOD THINNER:    If sent home on Xarelto         -14 days post-op for TKR       -30 days post-op for THR     If sent home home on ASA    325mg   BID x 4 weeks     Once  finished with prescribed blood thinner, patients can return to pre-surgical ASA dosage if they took ASA before surgery.     Home Health Nurse for Wound Checks and to remove staples on POD #  14  PT/SN to remove staples 14 days Post-op and apply skin prep and steri-strips.    On dressing change, apply new dressing while knee in flexed position.    Pt may shower if incision dressing has waterproof dressing in place. Removal and replacement of dressing after shower only needed if incision is suspected to have gotten wet during shower.  Otherwise change as previously described depending on dressing/drainage    No soaking in the tub or hot tub use. Cold therapy/Ice encouraged at least 20 minutes 2-3 times daily or more if desired.  Incision must be kept waterproof while icing.      FWB unless otherwise indicated.  Progress to cane as able.  Set up for outpatient PT as soon as able after staple removal once patient is MOD I with cane.    Outpatient Therapy: [facility]    [address] [address]   or  [address]  [place] [address]    Call [phone] to schedule appointment  Fax [phone]    If need orders: Call Arianna at Ext [phone]      Wear TEDS Bilateral Thigh High Stockings for 3 weeks  Baldemar hose x 3 weeks. Ok to remove baldemar hose 1-2 hours/day max if desired.       DME:  - rolling Walker  - 3 in 1 commode  - tub bench / shower chair  - Per PT/OT recommendation          Kelsey Rhodes

## 2018-04-30 NOTE — PLAN OF CARE
"Problem: Physical Therapy Goal  Goal: Physical Therapy Goal  Goals to be met by: 5/15/18     Patient will increase functional independence with mobility by performin. Supine to sit with supervision.   2. Sit to supine with supervision.   3. Sit<>stand transfer with supervision using rolling walker.   4. Gait > 150 feet with SBA using rolling walker.   5. Ascend/descend 6" curb step with RW and SBA  Outcome: Ongoing (interventions implemented as appropriate)  PT evaluation completed. Please see progress note for details, POC, and recommendations.       "

## 2018-04-30 NOTE — PT/OT/SLP PROGRESS
Occupational Therapy  Not Seen    Cheli Moe   MRN: [id number]     Patient not seen for Occupational Therapy today due to ( ) departmental protocol for elective surgery patients.    Patient with Primary localized osteoarthritis of right knee [M17.11], s/p Procedure(s):  REPLACEMENT-KNEE WITH NAVIGATION 4/30/2018 who will be seen for Occupational Therapy evaluation POD#1.    Caitlin Long, OT   4/30/2018

## 2018-04-30 NOTE — OP NOTE
[facility]  Operative Report    SUMMARY     Surgery Date: 4/30/2018     Surgeon(s) and Role:     * Jonathan Reaves MD - Primary    Assistant: MATTHIAS Grimes FA    Pre-op Diagnosis:  Primary localized osteoarthritis of right knee [M17.11]    Post-op Diagnosis:  Post-Op Diagnosis Codes:     * Primary localized osteoarthritis of right knee [M17.11]    Procedure(s) (LRB):  REPLACEMENT-KNEE WITH NAVIGATION (Right) (Kristin Persona)    Anesthesia: Spinal    Description of Procedure: The appropriate consent was signed. The patient understood and except all risks and complications. The patient was brought to the Operating Room after undergoing spinal anesthetic. Tourniquet was applied to the proximal operative leg. The lower extremity was then prepped and draped in a sterile manner. After exsanguination of Esmarch bandage, tourniquet was inflated to 300 mmHg. An anterior incision with a medial parapatellar arthrotomy was performed. The menisci, anterior cruciate ligament and patellar fat pad were excised. The patella was resurfaced and sized to a 32 mm patella.   Three holes were then drilled for the lugs and this was trialed. A hole was then  drilled in the distal femur, karen was placed down the femoral canal and the   distal femur cut in 6 degrees of valgus. The tibia was then cut utilizing the   Kristin navigation system in 0 degree varus valgus with 5 degrees in posterior   slope. Extension block was placed and full extension was noted. The femur was   then sized to a #6 and #6 cutting block was placed and the anterior and   posterior cuts as well as chamfer cuts were performed. The tibia was sized to a  size D and a trial was performed.Trials were performed and a 11 mm UC spacer was felt to be appropriate.The tibia was then prepared with the drill and the punch, and trials were   removed and the knee washed out. Aquamantys was used to paint the posterior   capsule and corners. Palacos cement with  gentamicin was then mixed and   pressurized sequentially in tibia, femur and patella. Components were impacted   and excess cement was removed. A trial 11 mm UC spacer was placed and knee   brought out to full extension. Once the cement was allowed to harden, the 11 mm UC  spacer was felt to be appropriate and this was locked into the tibial   baseplate. Tourniquet was deflated and hemostasis was obtained. Good patellar   tracking was noted. Exparel cocktail was injected into the fascia and   subcutaneous tissue. The fascial closure was obtained with a running #2 Quill suture. Subcutaneous closure was obtained with #1 and 2-0 Vicryl. Skin was then closed with the staples and a sterile compressive dressing was applied. The patient was then brought to the Recovery Room in a good condition.        Estimated Blood Loss: 100cc         Specimens:   Specimen (12h ago through future)    None

## 2018-04-30 NOTE — CONSULTS
Consult Note  IM    Consult Requested By: Jonathan Reaves MD  Reason for Consult: hypothyroidism, GERD, PONV, osteoporosis, RA, hyperlipidemia, HTN, CKD 3, and diverticulosis    SUBJECTIVE:     History of Present Illness:   67 y.o. female presents with a scheduled right knee repair. Epic and paper chart reviewed. Followed by Dr. Larry RAGSDALE as PCP and Dr Figueroa RAGSDALE for cardiology.  Had recent stress test that was normal, with no ischemia noted. Working with PT at time of eval, denies CP,SOB,F,C,N or V. Family at bedside. Treated prior to surgery for a UTI with Cipro which she did not finish.     Past Medical History:   Diagnosis Date    Anxiety     Arthritis     RA    Cancer     skin cancer    Cholelithiasis     Diverticulitis     Diverticulosis     Elevated LFTs     Encounter for blood transfusion     Hemorrhoids     History of colon polyps     Hyperlipidemia     Hypertension     Osteoporosis     PONV (postoperative nausea and vomiting)     Reflux     Thyroid disease     hypothyroidism     Past Surgical History:   Procedure Laterality Date    cholecystectomy      CHOLECYSTECTOMY      COLONOSCOPY N/A 10/24/2017    Procedure: SCREENING COLONOSCOPY;  Surgeon: Rodrigue Parker MD;  Location: [facility];  Service: Endoscopy;  Laterality: N/A;    COLONOSCOPY W/ POLYPECTOMY      EYE SURGERY Right     cataract    FRACTURE SURGERY Right     knee    HERNIA REPAIR  2010    stomach    HIATAL HERNIA REPAIR      removal of skin cancer      THYROIDECTOMY, PARTIAL      TONSILLECTOMY      [facility]  [facility]    [facility] her[facility] repair     Family History   Problem Relation Age of Onset    Stroke Mother     Heart attack Father      Social History   Substance Use Topics    Smoking status: Never Smoker    Smokeless tobacco: Never Used    Alcohol use No       Review of patient's allergies indicates:  No Known Allergies     Review of Systems:  Constitutional: No fever or  chills  Respiratory: No cough or shortness of breath  Cardiovascular: No chest pain or palpitations  Gastrointestinal: No nausea or vomiting  Neurological: No confusion or weakness    OBJECTIVE:     Vital Signs (Most Recent)  Temp: 97.6 °F (36.4 °C) (04/30/18 1300)  Pulse: (!) 57 (04/30/18 1300)  Resp: 18 (04/30/18 1300)  BP: (!) 155/70 (04/30/18 1300)  SpO2: 96 % (04/30/18 1335)    Vital Signs Range (Last 24H):  Temp:  [97.5 °F (36.4 °C)-97.7 °F (36.5 °C)]   Pulse:  [48-57]   Resp:  [16-18]   BP: (132-193)/(70-88)   SpO2:  [92 %-100 %]       Intake/Output Summary (Last 24 hours) at 04/30/18 1350  Last data filed at 04/30/18 1218   Gross per 24 hour   Intake             1300 ml   Output              600 ml   Net              700 ml       Physical Exam:  General appearance: Well developed, well nourished  Eyes:  Conjunctivae/corneas clear. PERRL.  Lungs: Normal respiratory effort,   clear to auscultation bilaterally   Heart: Regular rate and rhythm, S1, S2 normal, no murmur, rub or parker.  Abdomen: Soft, non-tender non-distended; bowel sounds normal; no masses,  no organomegaly  Extremities: No cyanosis or clubbing. No edema. ACE wrap to right knee CDI, +2 pulses BLE    Skin: Skin color, texture, turgor normal. No rashes or lesions  Neurologic: Normal strength and tone. No focal numbness or weakness   Colby      Laboratory:    Reviewed    Diagnostic Results:      ASSESSMENT/PLAN:     1. Right knee repair (M17.11): per therapy and ortho teams  2. GERD (K21.9): famotidine 20 mg BID  3. Osteoporosis (M81.0): on Prolia, defer  4. PONV (R11.2, Z98.890): anti-emetics  5. Hyperlipidemia (E78.5): no meds at this time  6. CKD 3a (N18.3): noted on pre-op labs. Renally dose meds, avoid nephrotoxins, and monitor I/O's closely.  7. HTN (I12.9): continue home med with hold parameters. Patient took her regular dose this AM.  Still having some elevated pressures at times. Will trial hydralazine IV 10 mg q 8 hours for  SBP>170.  8. RA (M19.90): on methotrexate weekly which is on hold at this time  9. Diverticulosis (K57.90): defer  10. Anxiety (F41.9): continue home med  11. DVT prophy:  mg BID, CHELSEY and SCD    Plan: Thanks for consult, See above recommendations and orders. Will follow along.

## 2018-04-30 NOTE — NURSING
1300 Pt admitted to room from PACU. Vs stable except BP. Will continue to monitor. Rt knee dressing dry and intact, polar ice on. IVF infusing. Pt stable on room air. Family at bedside. Safety precautions reviewed. Pt tolerating clear liquid diet. Diet advanced. Up to chair c physical  therapy and ambulated c walker. Medicated for pain after therapy. CPM machine fitted to pt. Colby catheter patent.

## 2018-04-30 NOTE — DISCHARGE INSTRUCTIONS
Knee Athroscopy Discharge Instructions    1) Pain: After surgery your knee will be sore. The knee will likely have been injected with a numbing medicine (Exparel) prior to completion of surgery for pain control. This is indicated on a green bracelet that you will continue to wear for 4 days after surgery. You will   also get a prescription for pain control before you leave the hospital. Ice and elevation will assist with pain control.    a) Apply ice as much as possible for the first 72 hours. After 72 hours, apply ice for 20minutes 3-4 times a day, after therapy,after exercising or whenever experiencing pain. Avoid direct skin contact with ice to prevent frostbit.          b) Elevate the affected leg with the pillow the length of the leg  to assist with swelling and pain.  2) Incision Care:  a) Some drainage from the incision in the first 72 hours is normal. If drainage is excessive,remove bandage,  pat dry, cover with sterile gauze and secure with tape. Notify physician about excessive drainage. Staples will be removed 14 days after surgery   3) Activity:  a) Perform exercises 2-3 x day.  b) You may shower 48 hours after surgery providing the dressing is waterproof. No tub or hot tub usage.DR LUNA PATIENTS CANNOT SHOWER UNTIL STAPLES ARE REMOVED. Support help is mandatory during showering. If the dressing becomes wet, replace with a new dressing.   c) Wear thigh high jason hose stockings for 3 weeks after surgery .You may remove stockings for 1- 2 hours during the day only. Send patient home with an extra pair jason hose.If your physician orders the CPM machine you are to use it for 2 hours in the am and 2 hours in the pm.Increase the flexion 5 degrees each session if tolerated. This is not to replace your exercise program.  4) For lifetime after your replacement surgery, you may need antibiotic coverage before dental or minor surgical procedures.  5) Possible Complications: Call Surgeon  a) Infection: Report these  signs and symptoms to your surgeon.  i) Unexpected redness around incision   ii) Persistent drainage from wound after 72 hours.  iii) Temperature ,can be treated with Tylenol. Do not go to the emergency room or urgent care center, call your surgeon.   iv) Additional swelling  v) Pain not controlled with current pain medication  b) Blood Clot: Report theses signs and symptoms to your surgeon  i) Unusual pain  ii) Red or discolored skin  iii) Swelling in the leg  iv) Unusual warm skin

## 2018-04-30 NOTE — PLAN OF CARE
"Problem: Physical Therapy Goal  Goal: Physical Therapy Goal  Goals to be met by: 5/15/18     Patient will increase functional independence with mobility by performin. Supine to sit with supervision.   2. Sit to supine with supervision.   3. Sit<>stand transfer with supervision using rolling walker.   4. Gait > 150 feet with SBA using rolling walker.   5. Ascend/descend 6" curb step with RW and SBA   Outcome: Ongoing (interventions implemented as appropriate)    Progressing well towards goals      "

## 2018-04-30 NOTE — PLAN OF CARE
Problem: Patient Care Overview  Goal: Plan of Care Review  Plan of care reviewed c pt and family. Verbalize understanding of goals of ambulation c assistance and pain control. Medicated for pain c good relief. Ambulate d x 2 in henry today.  Colby catheter remains patent c good output. Eating dinner , good appetite. Polar care on and rt knee dressing c ace wrap dry and intact. IVF infusing. Safety measures reviewed c pt. Purposeful hourly rounding done today.

## 2018-04-30 NOTE — ANESTHESIA POSTPROCEDURE EVALUATION
"Anesthesia Post Evaluation    Patient: Cheli Moe    Procedure(s) Performed: Procedure(s) (LRB):  REPLACEMENT-KNEE WITH NAVIGATION (Right)    Final Anesthesia Type: spinal  Patient location during evaluation: PACU  Patient participation: Yes- Able to Participate  Level of consciousness: awake and alert  Post-procedure vital signs: reviewed and stable  Pain management: adequate  Airway patency: patent  PONV status at discharge: No PONV  Anesthetic complications: no      Cardiovascular status: blood pressure returned to baseline  Respiratory status: unassisted  Hydration status: euvolemic  Follow-up not needed.        Visit Vitals  BP (!) 155/70 (BP Location: Left arm, Patient Position: Lying)   Pulse (!) 57   Temp 36.4 °C (97.6 °F) (Oral)   Resp 18   Ht 5' 1" (1.549 m)   Wt 72.1 kg (158 lb 15.9 oz)   SpO2 96%   Breastfeeding? No   BMI 30.04 kg/m²       Pain/Andres Score: Pain Assessment Performed: Yes (4/30/2018 10:11 AM)  Presence of Pain: complains of pain/discomfort (4/30/2018 12:18 PM)  Pain Rating Prior to Med Admin: 6 (4/30/2018  1:58 PM)  Pain Rating Post Med Admin: 4 (4/30/2018 12:30 PM)  Andres Score: 9 (4/30/2018 12:30 PM)      "

## 2018-04-30 NOTE — PLAN OF CARE
Met with patient at bedside to complete discharge planning assessment. Patient is current with Dr Soila Juarez PCP & pharmacy of choice is [facility] on [address] [place]. Patient lives with her  & adult children who will provide transportation home & assist as needed during recovery. Patient denied the need for any additional DME. Patient voiced her preference as [facility] - referral sent via [facility]      04/30/18 1303   Discharge Assessment   Assessment Type Discharge Planning Assessment   Confirmed/corrected address and phone number on facesheet? Yes   Assessment information obtained from? Patient   Expected Length of Stay (days) 1   Communicated expected length of stay with patient/caregiver yes   Prior to hospitilization cognitive status: Alert/Oriented   Prior to hospitalization functional status: Independent   Current cognitive status: Alert/Oriented   Current Functional Status: Needs Assistance;Assistive Equipment   Lives With spouse;child(pavel), adult   Able to Return to Prior Arrangements yes   Is patient able to care for self after discharge? Yes   Patient's perception of discharge disposition home health   Readmission Within The Last 30 Days no previous admission in last 30 days   Patient currently being followed by outpatient case management? No   Patient currently receives any other outside agency services? No   Equipment Currently Used at Home cane, straight;walker, rolling;wheelchair;bedside commode;shower chair   Do you have any problems affording any of your prescribed medications? No   Is the patient taking medications as prescribed? yes   Does the patient have transportation home? Yes   Transportation Available family or friend will provide   Does the patient receive services at the [facility]? No   Discharge Plan A Home Health   Patient/Family In Agreement With Plan yes

## 2018-05-01 VITALS
WEIGHT: 159 LBS | TEMPERATURE: 98 F | HEIGHT: 61 IN | DIASTOLIC BLOOD PRESSURE: 75 MMHG | OXYGEN SATURATION: 96 % | RESPIRATION RATE: 18 BRPM | BODY MASS INDEX: 30.02 KG/M2 | SYSTOLIC BLOOD PRESSURE: 174 MMHG | HEART RATE: 64 BPM

## 2018-05-01 PROBLEM — M17.11 PRIMARY OSTEOARTHRITIS OF RIGHT KNEE: Status: RESOLVED | Noted: 2018-04-30 | Resolved: 2018-05-01

## 2018-05-01 LAB
ERYTHROCYTE [DISTWIDTH] IN BLOOD BY AUTOMATED COUNT: 17 %
HCT VFR BLD AUTO: 34.2 %
HGB BLD-MCNC: 10.2 G/DL
MCH RBC QN AUTO: 25.1 PG
MCHC RBC AUTO-ENTMCNC: 29.8 G/DL
MCV RBC AUTO: 84 FL
PLATELET # BLD AUTO: 245 K/UL
PMV BLD AUTO: 10.8 FL
RBC # BLD AUTO: 4.07 M/UL
WBC # BLD AUTO: 6.61 K/UL

## 2018-05-01 PROCEDURE — 97530 THERAPEUTIC ACTIVITIES: CPT

## 2018-05-01 PROCEDURE — 85027 COMPLETE CBC AUTOMATED: CPT

## 2018-05-01 PROCEDURE — G8988 SELF CARE GOAL STATUS: HCPCS | Mod: CJ

## 2018-05-01 PROCEDURE — 97110 THERAPEUTIC EXERCISES: CPT

## 2018-05-01 PROCEDURE — G8989 SELF CARE D/C STATUS: HCPCS | Mod: CK

## 2018-05-01 PROCEDURE — 97165 OT EVAL LOW COMPLEX 30 MIN: CPT

## 2018-05-01 PROCEDURE — 36415 COLL VENOUS BLD VENIPUNCTURE: CPT

## 2018-05-01 PROCEDURE — 25000003 PHARM REV CODE 250

## 2018-05-01 PROCEDURE — 97535 SELF CARE MNGMENT TRAINING: CPT

## 2018-05-01 PROCEDURE — 94799 UNLISTED PULMONARY SVC/PX: CPT

## 2018-05-01 PROCEDURE — 63600175 PHARM REV CODE 636 W HCPCS

## 2018-05-01 PROCEDURE — 97116 GAIT TRAINING THERAPY: CPT | Mod: 59

## 2018-05-01 PROCEDURE — G8979 MOBILITY GOAL STATUS: HCPCS | Mod: CJ

## 2018-05-01 PROCEDURE — 25000003 PHARM REV CODE 250: Performed by: NURSE PRACTITIONER

## 2018-05-01 PROCEDURE — G8980 MOBILITY D/C STATUS: HCPCS | Mod: CK

## 2018-05-01 PROCEDURE — 94761 N-INVAS EAR/PLS OXIMETRY MLT: CPT

## 2018-05-01 PROCEDURE — G8987 SELF CARE CURRENT STATUS: HCPCS | Mod: CK

## 2018-05-01 RX ORDER — ASPIRIN 325 MG
325 TABLET ORAL EVERY 12 HOURS
Refills: 0 | COMMUNITY
Start: 2018-05-01 | End: 2019-06-25

## 2018-05-01 RX ORDER — OXYCODONE AND ACETAMINOPHEN 5; 325 MG/1; MG/1
TABLET ORAL
Qty: 90 TABLET | Refills: 0 | Status: SHIPPED | OUTPATIENT
Start: 2018-05-01 | End: 2019-01-09

## 2018-05-01 RX ADMIN — OXYCODONE HYDROCHLORIDE 5 MG: 5 TABLET ORAL at 09:05

## 2018-05-01 RX ADMIN — ACETAMINOPHEN 1000 MG: 10 INJECTION, SOLUTION INTRAVENOUS at 05:05

## 2018-05-01 RX ADMIN — OXYCODONE HYDROCHLORIDE 5 MG: 5 TABLET ORAL at 04:05

## 2018-05-01 RX ADMIN — FOLIC ACID 1000 MCG: 1 TABLET ORAL at 09:05

## 2018-05-01 RX ADMIN — MUPIROCIN 1 G: 20 OINTMENT TOPICAL at 09:05

## 2018-05-01 RX ADMIN — DOCUSATE SODIUM 100 MG: 100 CAPSULE, LIQUID FILLED ORAL at 09:05

## 2018-05-01 RX ADMIN — POLYETHYLENE GLYCOL 3350 17 G: 17 POWDER, FOR SOLUTION ORAL at 09:05

## 2018-05-01 RX ADMIN — BISOPROLOL FUMARATE 10 MG: 5 TABLET, COATED ORAL at 09:05

## 2018-05-01 RX ADMIN — ASPIRIN 325 MG ORAL TABLET 325 MG: 325 PILL ORAL at 09:05

## 2018-05-01 RX ADMIN — FAMOTIDINE 20 MG: 20 TABLET, FILM COATED ORAL at 09:05

## 2018-05-01 RX ADMIN — PROMETHAZINE HYDROCHLORIDE 6.25 MG: 25 INJECTION INTRAMUSCULAR; INTRAVENOUS at 09:05

## 2018-05-01 NOTE — PLAN OF CARE
Problem: Occupational Therapy Goal  Goal: Occupational Therapy Goal  Goals to be met by: 6/1/18     Patient will increase functional independence with ADLs by performing:    LE Dressing with Modified [place].  Grooming while standing at sink with Supervision.  Toilet transfer to bedside commode/BSC over toilet with Stand-by Assistance.    Outcome: Ongoing (interventions implemented as appropriate)  OT evaluation completed and treatment initiated.  Pt would benefit from skilled occupational therapy intervention for increased activity tolerance and independence in ADL's.

## 2018-05-01 NOTE — PROGRESS NOTES
"Progress Note  Orthopedics    Admit Date: 4/30/2018   Patient ID: Cheli Moe is a 67 y.o. female. POD#1 R TKR. Doing well, dressing dry, NV intact.  Amb 100 ft.  Plan for DC this pm.          Jonathan Reaves      Vital Sign (recent):  BP (!) 174/75 (Patient Position: Lying)   Pulse 64   Temp 98 °F (36.7 °C) (Oral)   Resp 18   Ht 5' 1" (1.549 m)   Wt 72.1 kg (158 lb 15.9 oz)   SpO2 96%   Breastfeeding? No   BMI 30.04 kg/m²       Laboratory:    CBC:   Recent Labs  Lab 05/01/18  0506   WBC 6.61   RBC 4.07   HGB 10.2*   HCT 34.2*      MCV 84   MCH 25.1*   MCHC 29.8*       CMP:   Recent Labs  Lab 04/25/18  1608   GLU 83   CALCIUM 10.1   ALBUMIN 3.7   PROT 7.9      K 4.1   CO2 24      BUN 17   CREATININE 1.0   ALKPHOS 100   ALT 18   AST 27   BILITOT 0.7           Intake/Output Summary (Last 24 hours) at 05/01/18 0811  Last data filed at 05/01/18 0616   Gross per 24 hour   Intake             2960 ml   Output             2200 ml   Net              760 ml         Current Medications:   aspirin  325 mg Oral Q12H    bisoprolol  10 mg Oral Daily    docusate sodium  100 mg Oral Q12H    famotidine  20 mg Oral BID    folic acid  1,000 mcg Oral Daily    mupirocin  1 g Nasal BID    polyethylene glycol  17 g Oral Daily    sertraline  50 mg Oral QHS       Continuous Infusions:   dextrose 5 % and 0.9 % NaCl 75 mL/hr at 04/30/18 1306     PRN Meds:.diphenhydrAMINE, hydrALAZINE, HYDROmorphone, ondansetron, oxyCODONE, oxyCODONE, promethazine (PHENERGAN) IVPB, sodium chloride 0.9%, sodium chloride 0.9%  "

## 2018-05-01 NOTE — PT/OT/SLP DISCHARGE
Occupational Therapy Discharge Summary    Cheli Moe  MRN: [id number]   Principal Problem: Primary osteoarthritis of right knee      Patient Discharged from acute Occupational Therapy on 5/1/18.  Please refer to prior OT note dated 5/1/18 for functional status.    Assessment:      Patient appropriate for care in another setting.    Objective:     GOALS:    Occupational Therapy Goals        Problem: Occupational Therapy Goal    Goal Priority Disciplines Outcome Interventions   Occupational Therapy Goal     OT, PT/OT Ongoing (interventions implemented as appropriate)    Description:  Goals to be met by: 6/1/18     Patient will increase functional independence with ADLs by performing:    LE Dressing with Modified [place].  Grooming while standing at sink with Supervision.  Toilet transfer to bedside commode/BSC over toilet with Stand-by Assistance.                      Reasons for Discontinuation of Therapy Services  Transfer to alternate level of care.      Plan:     Patient Discharged to: Home with Home Health Service    Caitlin Long, OT  5/1/2018

## 2018-05-01 NOTE — NURSING
1200 Pt up in chair. Ambulated c physical therapy this am. Had nausea and vomiting during activity. Pt medicated c phenergan IVPB. Good relief obtained. Voided s difficulty. AM meds given p nausea resolved. CPM reviewed c  and adjusted to pt. Prescription filled. Pt up in chair waiting for second physical therapy before discharge home. Dressing dry and intact to rt knee and ace wrap. Polar cie on. Good neuro check to LE.

## 2018-05-01 NOTE — PT/OT/SLP EVAL
"Occupational Therapy   Evaluation and Treatment    Name: Cheli Moe  MRN: [id number]  Admitting Diagnosis:  Primary osteoarthritis of right knee 1 Day Post-Op    Recommendations:     Discharge Recommendations: home with home health, home health OT  Discharge Equipment Recommendations:  none  Barriers to discharge:  None    History:     Occupational Profile:  Pt lives with her  and 2 daughters (one daughter is "special needs" and requires aides for assistance). They live in a 1 story house with 1 small [place]IS[place].   Prior to admission, patients level of function was mod (I) with using a RW for gait and mod (I) for toileting using a BSC and a tub transfer bench for bathing mod (I). Pt endorses urinary incontinence. She endorses a fall "a couple of months ago" without injury.  Patient has the following equipment: 3-in-1 commode, walker, rolling  And transfer tub bench.  DME owned (not currently used): wheelchair and quad cane.  Upon discharge, patient will have assistance from .    Past Medical History:   Diagnosis Date    Anxiety     Arthritis     RA    Cancer     skin cancer    Cholelithiasis     Diverticulitis     Diverticulosis     Elevated LFTs     Encounter for blood transfusion     Hemorrhoids     History of colon polyps     Hyperlipidemia     Hypertension     Osteoporosis     PONV (postoperative nausea and vomiting)     Reflux     Thyroid disease     hypothyroidism       Past Surgical History:   Procedure Laterality Date    cholecystectomy      CHOLECYSTECTOMY      COLONOSCOPY N/A 10/24/2017    Procedure: SCREENING COLONOSCOPY;  Surgeon: Rodrigue Parker MD;  Location: [facility];  Service: Endoscopy;  Laterality: N/A;    COLONOSCOPY W/ POLYPECTOMY      EYE SURGERY Right     cataract    FRACTURE SURGERY Right     knee    HERNIA REPAIR  2010    stomach    HIATAL HERNIA REPAIR      removal of skin cancer      THYROIDECTOMY, PARTIAL      TONSILLECTOMY      TRACHEAL " SURGERY  2010    in [facility] 2months after hernia repair       Subjective     Chief Complaint: Pain and increased confusion.   Patient/Family stated goals:   Communicated with: nursing prior to session.  Pain/Comfort:  · Pain Rating 1: 4/10  · Location - Side 1: Right  · Location - Orientation 1: posterior  · Location 1: knee  · Pain Addressed 1: Reposition, Distraction  · Pain Rating Post-Intervention 1: 4/10    Patients cultural, spiritual, Adventist conflicts given the current situation: none specified    Objective:     Patient found with: peripheral IV    General Precautions: Standard, fall   Orthopedic Precautions:Full weight bearing   Braces: N/A     Occupational Performance:    Functional Mobility/Transfers:  · Patient completed Sit <> Stand Transfer with contact guard assistance  with  rolling walker   · Patient completed Toilet Transfer Step Transfer technique with minimum assistance with  BSC. Pt with urinary incontinence    · Functional Mobility: with rolling walker CGA for safety and verbal cues for walker management around hospital room.     Activities of Daily Living:  · UB Dressing: modified independence donning shirt  · LB Dressing: contact guard assistance .  · Toileting: contact guard assistance for helene-care in standing    Cognitive/Visual Perceptual:  Cognitive/Psychosocial Skills:     -       Oriented to: Person and Place   -       Follows Commands/attention:Easily distracted and Follows one-step commands  -       Communication: clear/fluent  -       Memory: Poor immediate recall  -       Safety awareness/insight to disability: impaired   -       Mood/Affect/Coping skills/emotional control: Appropriate to situation  Visual/Perceptual:      -Intact    Physical Exam:  Postural examination/scapula alignment:    -       Rounded shoulders  -       Forward head  Skin integrity: Visible skin intact  Edema:  None noted  Sensation:    -       Intact  Motor Planning:    -       fair. Pt required verbal  "cues for sequencing, walker safety, hand placement etc  Dominant hand:    -       right  Upper Extremity Range of Motion:     -       Right Upper Extremity: WFL  -       Left Upper Extremity: WFL  Upper Extremity Strength:    -       Right Upper Extremity: WFL  -       Left Upper Extremity: WFL   Strength:    -       Right Upper Extremity: WFL  -       Left Upper Extremity: WFL  Fine Motor Coordination:    -       Intact    Patient left ambulating with PT with chair follow for safety in hallway with PTA and spouse present    [facility] 6 Click:  [facility] Total Score: 19    Treatment & Education:  Functional transfers, safety with transfers and walker management, hand placement with transfers, education on OT role and POC.   Education:  Pt with increased confusion and decreased safety awareness during session and required verbal and tactile cues for safety and sequencing of tasks.     Assessment:     Cheli Moe is a 67 y.o. female with a medical diagnosis of Primary osteoarthritis of right knee.  She presents with the following Performance deficits affecting function are weakness, impaired endurance, impaired self care skills, decreased lower extremity function, impaired cognition, decreased safety awareness, pain, gait instability, impaired functional mobilty, impaired balance.  OT evaluation completed and treatment initiated. Pt presents with decreased safety with transfers and functional mobility and requires verbal cues for safety. Pt would benefit from skilled occupational therapy intervention for increased activity tolerance and independence in ADL's.    Rehab Prognosis:  Good; patient would benefit from acute skilled OT services to address these deficits and reach maximum level of function.         Clinical Decision Makin.  OT Low:  "Pt evaluation falls under low complexity for evaluation coding due to performance deficits noted in 1-3 areas as stated above and 0 co-morbities affecting current " "functional status. Data obtained from problem focused assessments. No modifications or assistance was required for completion of evaluation. Only brief occupational profile and history review completed."     Plan:     Patient to be seen daily to address the above listed problems via self-care/home management, therapeutic activities, therapeutic exercises  · Plan of Care Expires: 05/31/18  · Plan of Care Reviewed with: patient    This Plan of care has been discussed with the patient who was involved in its development and understands and is in agreement with the identified goals and treatment plan    GOALS:    Occupational Therapy Goals        Problem: Occupational Therapy Goal    Goal Priority Disciplines Outcome Interventions   Occupational Therapy Goal     OT, PT/OT Ongoing (interventions implemented as appropriate)    Description:  Goals to be met by: 6/1/18     Patient will increase functional independence with ADLs by performing:    LE Dressing with Modified [place].  Grooming while standing at sink with Supervision.  Toilet transfer to bedside commode/BSC over toilet with Stand-by Assistance.                      Time Tracking:     OT Date of Treatment: 05/01/18  OT Start Time: 1252  OT Stop Time: 1318  OT Total Time (min): 26 min    Billable Minutes:Evaluation 15  Self Care/Home Management 11    Caitlin Long OT  5/1/2018    "

## 2018-05-01 NOTE — PT/OT/SLP PROGRESS
"Physical Therapy Treatment    Patient Name:  Cheli Meo   MRN:  [id number]    Recommendations:     Discharge Recommendations:  home health PT   Discharge Equipment Recommendations: none   Barriers to discharge: None    Assessment:     Cheli Moe is a 67 y.o. female admitted with a medical diagnosis of Primary osteoarthritis of right knee.  She presents with the following impairments/functional limitations:  weakness, impaired endurance, decreased ROM, gait instability patient tolerated treatment session well and progressing well towards goals.     Rehab Prognosis:  good; patient would benefit from acute skilled PT services to address these deficits and reach maximum level of function.      Recent Surgery: Procedure(s) (LRB):  REPLACEMENT-KNEE WITH NAVIGATION (Right) 1 Day Post-Op    Plan:     During this hospitalization, patient to be seen BID to address the above listed problems via gait training, therapeutic activities, therapeutic exercises, neuromuscular re-education  · Plan of Care Expires:  05/30/18   Plan of Care Reviewed with: patient, spouse, daughter    Subjective     Communicated with nurse prior to session.  Patient found seated upon PT entry to room, agreeable to treatment.      Chief Complaint: patient was agreeable to participate.   Patient comments/goals: patient stated " I'm ready to get moving"   Pain/Comfort:  ·   patient complained of 6/10 pain initially  · Then after treatment patient 0/10 with cessation of activity    Patients cultural, spiritual, Hoahaoism conflicts given the current situation: none specified    Objective:     Patient found with: peripheral IV, pepe catheter     General Precautions: Standard, fall   Orthopedic Precautions:Full weight bearing   Braces:    none    Functional Mobility:  · Sit to stand from bed with Rolling walker with CGA for safety and balance  · Sit to supine with Supervision   · Patient gait trained 125 feet with Rolling walker with CGA for safety " "patient required constant verbal cues throughout. Patient demo decrease step length, slow remy.     Therapeutic Activities and Exercises:  Patient performed therex X 15 reps AROM/AAROM R LE per TKA protocol      Patient left up in chair with all lines intact, call button in reach, nurse notified and family  present..    GOALS:    Physical Therapy Goals        Problem: Physical Therapy Goal    Goal Priority Disciplines Outcome Goal Variances Interventions   Physical Therapy Goal     PT/OT, PT Ongoing (interventions implemented as appropriate)     Description:  Goals to be met by: 5/15/18     Patient will increase functional independence with mobility by performin. Supine to sit with supervision.   2. Sit to supine with supervision.   3. Sit<>stand transfer with supervision using rolling walker.   4. Gait > 150 feet with SBA using rolling walker.   5. Ascend/descend 6" curb step with RW and SBA                    Time Tracking:     PT Received On: 18  PT Start Time: 1435     PT Stop Time: 1500  PT Total Time (min): 25 min     Billable Minutes: Gait Training 15 and Therapeutic Exercise 10    Treatment Type: Treatment  PT/PTA: ESME Kelley PTA  2018  "

## 2018-05-01 NOTE — PLAN OF CARE
"Problem: Physical Therapy Goal  Goal: Physical Therapy Goal  Goals to be met by: 5/15/18     Patient will increase functional independence with mobility by performin. Supine to sit with supervision. MET 18  2. Sit to supine with supervision.   3. Sit<>stand transfer with supervision using rolling walker.   4. Gait > 150 feet with SBA using rolling walker.   5. Ascend/descend 6" curb step with RW and SBA    Outcome: Ongoing (interventions implemented as appropriate)    Patient required CGA for all functional mobility. Slightly complained of her head not feeling right and confused at times.       "

## 2018-05-01 NOTE — PT/OT/SLP PROGRESS
Physical Therapy Treatment    Patient Name:  Cheli Moe   MRN:  [id number]    Recommendations:     Discharge Recommendations:  home health PT   Discharge Equipment Recommendations: none   Barriers to discharge: None    Assessment:     Cheli Moe is a 67 y.o. female admitted with a medical diagnosis of Primary osteoarthritis of right knee.  She presents with the following impairments/functional limitations:  weakness, impaired endurance, decreased ROM patient was limited with mobility secondary to nausea and vomiting. Patient was motivated to participate.     Rehab Prognosis:  good; patient would benefit from acute skilled PT services to address these deficits and reach maximum level of function.      Recent Surgery: Procedure(s) (LRB):  REPLACEMENT-KNEE WITH NAVIGATION (Right) 1 Day Post-Op    Plan:     During this hospitalization, patient to be seen BID to address the above listed problems via gait training, therapeutic activities, therapeutic exercises, neuromuscular re-education  · Plan of Care Expires:  05/30/18   Plan of Care Reviewed with: patient    Subjective     Communicated with nurse prior to session.  Patient found supine upon PT entry to room, agreeable to treatment.      Chief Complaint: nurse  Patient comments/goals: patient reported I need to feel better   Pain/Comfort:  · Pain Rating 1: 5/10  · Location - Side 1: Right  · Location - Orientation 1: generalized  · Location 1: knee  · Pain Addressed 1: Distraction, Reposition  · Pain Rating Post-Intervention 1: 3/10    Patients cultural, spiritual, Religion conflicts given the current situation: none specified    Objective:     Patient found with: peripheral IV     General Precautions: Standard, fall   Orthopedic Precautions:Full weight bearing   Braces: N/A     Functional Mobility:  · Supine to sit with Modified Independent   · Sit to stand from bed with Rolling walker with SBA for safety   · Patient gait trained 10 feet with Rolling  "walker with CGA for safety. Patient demo decrease step length, slow remy. Limited with gait; patient complained of nausea and vomited once seated.   · Patient static stood/dynamic standing balance was good with Rolling walker. Patient had urinary incontinence episode and required assistance to change gown and clean. Nurse was present and aware.       AM-PAC 6 CLICK MOBILITY  Turning over in bed (including adjusting bedclothes, sheets and blankets)?: 3  Sitting down on and standing up from a chair with arms (e.g., wheelchair, bedside commode, etc.): 3  Moving from lying on back to sitting on the side of the bed?: 3  Moving to and from a bed to a chair (including a wheelchair)?: 3  Need to walk in hospital room?: 3  Climbing 3-5 steps with a railing?: 3  Total Score: 18       Therapeutic Activities and Exercises:  Patient performed therex X 15 reps AROM/AAROM R LE per TKA protocol     Patient left up in chair with all lines intact, call button in reach, nurse notified and  present..    GOALS:    Physical Therapy Goals        Problem: Physical Therapy Goal    Goal Priority Disciplines Outcome Goal Variances Interventions   Physical Therapy Goal     PT/OT, PT Ongoing (interventions implemented as appropriate)     Description:  Goals to be met by: 5/15/18     Patient will increase functional independence with mobility by performin. Supine to sit with supervision. MET 18  2. Sit to supine with supervision.   3. Sit<>stand transfer with supervision using rolling walker.   4. Gait > 150 feet with SBA using rolling walker.   5. Ascend/descend 6" curb step with RW and SBA                     Time Tracking:     PT Received On: 18  PT Start Time: 830     PT Stop Time: 915  PT Total Time (min): 45 min     Billable Minutes: Gait Training 8, Therapeutic Activity 20 and Therapeutic Exercise 17    Treatment Type: Treatment  PT/PTA: PTA     PTA Visit Number: 1     Karine Kelley, ESME  2018  "

## 2018-05-01 NOTE — PT/OT/SLP DISCHARGE
"Physical Therapy Discharge Summary    Name: Cheli Moe  MRN: [id number]   Principal Problem: Primary osteoarthritis of right knee     Patient Discharged from acute Physical Therapy on 2018 .  Please refer to prior PT noted date on 2018  for functional status.     Assessment:     Goals partially met.    Objective:     GOALS:    Physical Therapy Goals        Problem: Physical Therapy Goal    Goal Priority Disciplines Outcome Goal Variances Interventions   Physical Therapy Goal     PT/OT, PT Ongoing (interventions implemented as appropriate)     Description:  Goals to be met by: 5/15/18     Patient will increase functional independence with mobility by performin. Supine to sit with supervision. MET 18  2. Sit to supine with supervision.   3. Sit<>stand transfer with supervision using rolling walker.   4. Gait > 150 feet with SBA using rolling walker.   5. Ascend/descend 6" curb step with RW and SBA                     Reasons for Discontinuation of Therapy Services  Transfer to alternate level of care.      Plan:     Patient Discharged to: Home with Home Health Service.    Purvi Mejia, PT  2018  "

## 2018-05-01 NOTE — PT/OT/SLP PROGRESS
Physical Therapy Treatment    Patient Name:  Cheli Moe   MRN:  [id number]    Recommendations:     Discharge Recommendations:  home health PT   Discharge Equipment Recommendations: none   Barriers to discharge: patients  will be home 24/7 with patient    Assessment:     Cheli Moe is a 67 y.o. female admitted with a medical diagnosis of Primary osteoarthritis of right knee.  She presents with the following impairments/functional limitations:  weakness, impaired endurance, impaired functional mobilty, gait instability, impaired balance, decreased coordination, decreased safety awareness, impaired cognition patient still presents with slight confusion and required constant verbal and tactile cues to follow through with proper technique. Patient required CGA for all functional mobility.     Rehab Prognosis:  good; patient would benefit from acute skilled PT services to address these deficits and reach maximum level of function.      Recent Surgery: Procedure(s) (LRB):  REPLACEMENT-KNEE WITH NAVIGATION (Right) 1 Day Post-Op    Plan:     During this hospitalization, patient to be seen BID to address the above listed problems via gait training, therapeutic activities, therapeutic exercises, neuromuscular re-education  · Plan of Care Expires:  05/30/18   Plan of Care Reviewed with: patient, spouse    Subjective     Communicated with nurse prior to session.  Patient found seated upon PT entry to room, agreeable to treatment.      Chief Complaint: patient reported my head feels a little cloudy  Patient comments/goals: home   Pain/Comfort:  · Pain Rating 1:  (patient never rated pain but complained of pain in posterior knee (left))  · Location - Side 1: Right  · Location - Orientation 1: generalized  · Location 1: knee  · Pain Addressed 1: Distraction, Reposition  · Pain Rating Post-Intervention 1: 3/10    Patients cultural, spiritual, Druze conflicts given the current situation: none  "specified    Objective:     Patient found with: peripheral IV     General Precautions: Standard, fall   Orthopedic Precautions:Full weight bearing   Braces: N/A     Functional Mobility:  · Sit <> stand from bedside chair with RW with CGA constant verbal cues for hand placement X 2 trials   · Patient gait trained 75 feet with RW with CGA for safety and balance. Patient had one episode of LOB and required CGA to correct, patient demo step through gait pattern, forward flex trunk decrease step length, antalgic gait. Patient required tactile cues for upright posture.     AM-PAC 6 CLICK MOBILITY  Turning over in bed (including adjusting bedclothes, sheets and blankets)?: 3  Sitting down on and standing up from a chair with arms (e.g., wheelchair, bedside commode, etc.): 3  Moving from lying on back to sitting on the side of the bed?: 3  Moving to and from a bed to a chair (including a wheelchair)?: 3  Need to walk in hospital room?: 3  Climbing 3-5 steps with a railing?: 3  Total Score: 18       Therapeutic Activities and Exercises:  Patient performed therex X 15 reps per TKA protocol AAROM  Educated patient and spouse on CPM and proper use and importance of patient having assistance  upon discharge.     Patient left up in chair with all lines intact, call button in reach, nurse notified and spouse present..    GOALS:    Physical Therapy Goals        Problem: Physical Therapy Goal    Goal Priority Disciplines Outcome Goal Variances Interventions   Physical Therapy Goal     PT/OT, PT Ongoing (interventions implemented as appropriate)     Description:  Goals to be met by: 5/15/18     Patient will increase functional independence with mobility by performin. Supine to sit with supervision. MET 18  2. Sit to supine with supervision.   3. Sit<>stand transfer with supervision using rolling walker.   4. Gait > 150 feet with SBA using rolling walker.   5. Ascend/descend 6" curb step with RW and SBA               "       Time Tracking:     PT Received On: 05/01/18  PT Start Time: 1310     PT Stop Time: 1340  PT Total Time (min): 30 min     Billable Minutes: Gait Training 15 and Therapeutic Exercise 15    Treatment Type: Treatment  PT/PTA: PTA     PTA Visit Number: 1     Karine Kelley, PTA  05/01/2018

## 2018-05-01 NOTE — PLAN OF CARE
"Problem: Physical Therapy Goal  Goal: Physical Therapy Goal  Goals to be met by: 5/15/18     Patient will increase functional independence with mobility by performin. Supine to sit with supervision. MET 18  2. Sit to supine with supervision.   3. Sit<>stand transfer with supervision using rolling walker.   4. Gait > 150 feet with SBA using rolling walker.   5. Ascend/descend 6" curb step with RW and SBA   Outcome: Ongoing (interventions implemented as appropriate)    Progressing well towards goals       "

## 2018-05-01 NOTE — PLAN OF CARE
Problem: Patient Care Overview  Goal: Plan of Care Review  Outcome: Ongoing (interventions implemented as appropriate)  Pt remains free from injury or falls. Vital signs stable throughout night on room air. Ace wrap to right knee dry and intact, polar ice to site continuous.  Pain managed with scheduled IV tylenol, no complaints of nausea. Colby draining to gravity. Bed in low locked position and call light within reach.  Will continue to monitor.     0440- Oxycodone 5 mg administered for pain. Will continue to monitor.

## 2018-05-01 NOTE — PROGRESS NOTES
"IM  Progress Note    Admit Date: 4/30/2018   LOS: 1 day     SUBJECTIVE:     Follow-up For:  Primary osteoarthritis of right knee    Interval History:     POD #1 right knee repair. Reports some nausea overnight and a headache that she attributed to Zofran.  Feeling better today, Denies CP,SOB,F,C, or calf tenderness. Will discharge today. Due to void.    Review of Systems:  Constitutional: No fever or chills  Respiratory: No cough or shortness of breath  Cardiovascular: No chest pain or palpitations  Gastrointestinal: No nausea or vomiting  Neurological: No confusion or weakness    OBJECTIVE:     Vital Signs Range (Last 24H):  BP (!) 174/75 (Patient Position: Lying)   Pulse 64   Temp 98 °F (36.7 °C) (Oral)   Resp 18   Ht 5' 1" (1.549 m)   Wt 72.1 kg (158 lb 15.9 oz)   SpO2 96%   Breastfeeding? No   BMI 30.04 kg/m²     Temp:  [97.5 °F (36.4 °C)-98 °F (36.7 °C)]   Pulse:  [48-74]   Resp:  [16-18]   BP: (132-193)/(60-88)   SpO2:  [92 %-100 %]     I & O (Last 24H):  Intake/Output Summary (Last 24 hours) at 05/01/18 0823  Last data filed at 05/01/18 0616   Gross per 24 hour   Intake             2960 ml   Output             2200 ml   Net              760 ml       Physical Exam:  General appearance: Well developed, well nourished  Eyes:  Conjunctivae/corneas clear. PERRL.  Lungs: Normal respiratory effort,   clear to auscultation bilaterally   Heart: Regular rate and rhythm, S1, S2 normal, no murmur, rub or parker.  Abdomen: Soft, non-tender non-distended; bowel sounds normal; no masses,  no organomegaly  Extremities: No cyanosis or clubbing. No edema.  ACE wrap to right knee CDI  Skin: Skin color, texture, turgor normal. No rashes or lesions  Neurologic: Normal strength and tone. No focal numbness or weakness     Laboratory Data:    Recent Labs  Lab 05/01/18  0506   WBC 6.61   RBC 4.07   HGB 10.2*   HCT 34.2*      MCV 84   MCH 25.1*   MCHC 29.8*       BMP:   Recent Labs  Lab 04/25/18  1608   GLU 83    "   K 4.1      CO2 24   BUN 17   CREATININE 1.0   CALCIUM 10.1     Lab Results   Component Value Date    CALCIUM 10.1 04/25/2018               Medications:  Medication list was reviewed and changes noted under Assessment/Plan.    Diagnostic Results:    Reviewed      ASSESSMENT/PLAN:     1. Right knee repair (M17.11): POD #1, per therapy and ortho teams  2. GERD (K21.9): famotidine 20 mg BID  3. Osteoporosis (M81.0): on Prolia, defer  4. PONV (R11.2, Z98.890): anti-emetics  5. Anemia (D64.9): Dilutional vs expected surgical loss  6. Hyperlipidemia (E78.5): no meds at this time  7. CKD 3a (N18.3): noted on pre-op labs. Renally dose meds, avoid nephrotoxins, and monitor I/O's closely.  8. HTN (I12.9): continue home med with hold parameters. Patient took her regular dose this AM.  Still having some elevated pressures at times. Will trial hydralazine IV 10 mg q 8 hours for SBP>170.  9. RA (M19.90): on methotrexate weekly which is on hold at this time  10. Diverticulosis (K57.90): defer  11. Anxiety (F41.9): continue home med  12. DVT prophy:  mg BID, CHELSEY and SCD     Plan: Medically stable for discharge at the time of this note.

## 2018-05-04 NOTE — DISCHARGE SUMMARY
[facility]  Short Stay  Discharge Summary  TOTAL KNEE REPLACEMENT    Admit Date: 4/30/2018    Discharge Date and Time: 5/1/2018  1:55 PM      Discharge Attending Physician: Jonathan Reaves MD    Hospital Course:  Cheli Moe,is a 67 y.o. female with severe osteoarthritis R knee, unrelieved with the conservative measures. The patient was admitted on  4/30/2018 and underwent R total knee replacement with computer-assisted navigation. Postoperatively, weightbearing as tolerated with a walker and CPM machine was initiated on postop day #1. Cheli Moe did quite well and was discharged on 5/1/2018  with home health physical therapy and nursing. The patient will be on Percocet for pain and aspirin 325 mg p.o. b.i.d. with meals x4 weeks.  A CPM machine will will be sent home with the patient. Postoperative follow up will be in the office in 4 weeks.       Final Diagnoses:    Principal Problem: Primary osteoarthritis of right knee   Secondary Diagnoses:   Active Hospital Problems    Diagnosis  POA   No active problems to display.      Resolved Hospital Problems    Diagnosis Date Resolved POA    *Primary osteoarthritis of right knee [M17.11] 05/01/2018 Yes       Discharged Condition: good    Disposition: Home-Health Care [facility]    Follow up/Patient Instructions:    Medications:  Reconciled Home Medications:      Medication List      START taking these medications    aspirin 325 MG tablet  Take 1 tablet (325 mg total) by mouth every 12 (twelve) hours. For 4 weeks  Replaces:  aspirin 81 MG EC tablet     oxyCODONE-acetaminophen 5-325 mg per tablet  Commonly known as:  PERCOCET  1 tab every 4 hours PRN pain or 2 tablets every 6 hours PRN pain        CONTINUE taking these medications    biotin 1 mg tablet  Take 5,000 mcg by mouth once daily.     bisoprolol 10 MG tablet  Commonly known as:  ZEBETA  Take 10 mg by mouth once daily.     calcium carbonate 600 mg calcium (1,500 mg) Tab  Commonly known as:  OS-DENA  Take  "500 mg by mouth once daily.     folic acid 800 MCG Tab  Commonly known as:  FOLVITE  Take 800 mcg by mouth once daily.     methotrexate 2.5 MG Tab  Take 2.5 mg by mouth every 7 days. Will stop prior to surgery-     pantoprazole 40 MG tablet  Commonly known as:  PROTONIX  Take 40 mg by mouth once daily.     PROLIA 60 mg/mL Syrg  Generic drug:  denosumab  Inject 60 mg into the skin. Per doctors office every 6 months     ranitidine 150 MG tablet  Commonly known as:  ZANTAC  Take 150 mg by mouth 2 (two) times daily.     sertraline 50 MG tablet  Commonly known as:  ZOLOFT  Take 50 mg by mouth every evening.     VITAMIN D3 1,000 unit capsule  Generic drug:  cholecalciferol (vitamin D3)  Take 1,000 Units by mouth once daily.        STOP taking these medications    aspirin 81 MG EC tablet  Commonly known as:  ECOTRIN  Replaced by:  aspirin 325 MG tablet     multivitamin capsule            Discharge Procedure Orders  WALKER FOR HOME USE   Order Specific Question Answer Comments   Type of Walker: Adult (5'4"-6'6")    With wheels? Yes    Height: 5' 1" (1.549 m)    Weight: 72.1 kg (158 lb 15.9 oz)    Does patient have medical equipment at home? 3-in-1 commode    Does patient have medical equipment at home? walker, rolling    Length of need (1-99 months): 99      3 IN 1 COMMODE FOR HOME USE   Order Specific Question Answer Comments   Type: Standard    Height: 5' 1" (1.549 m)    Weight: 72.1 kg (158 lb 15.9 oz)    Does patient have medical equipment at home? 3-in-1 commode    Does patient have medical equipment at home? walker, rolling    Length of need (1-99 months): 99      CANE FOR HOME USE   Order Specific Question Answer Comments   Type of Cane: Straight    Height: 5' 1" (1.549 m)    Weight: 72.1 kg (158 lb 15.9 oz)    Does patient have medical equipment at home? 3-in-1 commode    Does patient have medical equipment at home? walker, rolling    Length of need (1-99 months): 99        Follow-up Information     [facility] " [facility].    Specialty:  Home Health Services  Why:  Home Health   Contact information:  [address]  [place] [address]  [phone]             Jonathan Reaves MD In 4 weeks.    Specialty:  Orthopedic Surgery  Contact information:  [address]  [facility]  [facility] [address]  [phone]

## 2019-11-11 PROBLEM — M81.0 OSTEOPOROSIS: Status: ACTIVE | Noted: 2019-11-11

## 2019-11-12 PROBLEM — D53.9 NUTRITIONAL ANEMIA, UNSPECIFIED: Status: ACTIVE | Noted: 2019-11-12

## 2019-11-12 PROBLEM — D50.0 ANEMIA DUE TO CHRONIC BLOOD LOSS: Status: ACTIVE | Noted: 2019-11-12

## 2020-02-26 PROBLEM — R51.9 FRONTAL HEADACHE: Status: ACTIVE | Noted: 2020-02-26

## 2020-02-26 PROBLEM — G43.919 INTRACTABLE MIGRAINE: Status: ACTIVE | Noted: 2020-02-26

## 2020-02-26 PROBLEM — E78.5 HYPERLIPIDEMIA: Status: ACTIVE | Noted: 2020-02-26

## 2020-02-26 PROBLEM — I16.0 HYPERTENSIVE URGENCY: Status: ACTIVE | Noted: 2020-02-26

## 2020-02-27 PROBLEM — R51.9 FRONTAL HEADACHE: Status: RESOLVED | Noted: 2020-02-26 | Resolved: 2020-02-27

## 2020-02-27 PROBLEM — I16.0 HYPERTENSIVE URGENCY: Status: RESOLVED | Noted: 2020-02-26 | Resolved: 2020-02-27

## 2020-02-27 PROBLEM — G43.919 INTRACTABLE MIGRAINE: Status: RESOLVED | Noted: 2020-02-26 | Resolved: 2020-02-27

## 2021-05-06 ENCOUNTER — PATIENT MESSAGE (OUTPATIENT)
Dept: RESEARCH | Facility: HOSPITAL | Age: 71
End: 2021-05-06

## 2021-06-01 PROBLEM — N30.01 ACUTE CYSTITIS WITH HEMATURIA: Status: ACTIVE | Noted: 2021-06-01

## 2021-06-01 PROBLEM — S72.001A CLOSED FRACTURE OF RIGHT HIP: Status: ACTIVE | Noted: 2021-06-01

## 2021-06-03 PROBLEM — I44.1 AV BLOCK, 2ND DEGREE: Status: ACTIVE | Noted: 2021-06-03

## 2021-06-07 PROBLEM — R00.1 SYMPTOMATIC BRADYCARDIA: Status: ACTIVE | Noted: 2021-06-07
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
## 2021-06-08 PROBLEM — E63.9 INADEQUATE DIETARY ENERGY INTAKE: Status: ACTIVE | Noted: 2021-06-08

## 2021-07-17 ENCOUNTER — EXTERNAL HOME HEALTH (OUTPATIENT)
Dept: HOME HEALTH SERVICES | Facility: HOSPITAL | Age: 71
End: 2021-07-17
Payer: MEDICARE

## 2021-11-18 DIAGNOSIS — U07.1 COVID-19 VIRUS DETECTED: ICD-10-CM

## 2021-11-18 PROBLEM — R41.82 AMS (ALTERED MENTAL STATUS): Status: ACTIVE | Noted: 2021-11-18

## 2021-11-18 PROBLEM — N17.9 AKI (ACUTE KIDNEY INJURY): Status: ACTIVE | Noted: 2021-11-18

## 2021-11-22 ENCOUNTER — PATIENT OUTREACH (OUTPATIENT)
Dept: ADMINISTRATIVE | Facility: CLINIC | Age: 71
End: 2021-11-22
Payer: MEDICARE

## (undated) DEVICE — SOL 9P NACL IRR PIC IL

## (undated) DEVICE — HOOD T-5 TEAR AWAY STERILE

## (undated) DEVICE — SEE MEDLINE ITEM 146231

## (undated) DEVICE — PRESSURIZER BN CEMENT NOZZLE

## (undated) DEVICE — SEE MEDLINE ITEM 146298

## (undated) DEVICE — DRAPE STERI INSTRUMENT 1018

## (undated) DEVICE — APPLICATOR CHLORAPREP ORN 26ML

## (undated) DEVICE — ELECTRODE REM PLYHSV RETURN 9

## (undated) DEVICE — SUT STRATAFIX PDS 1 CTX 18IN

## (undated) DEVICE — NDL SAFETY 22G X 1.5 ECLIPSE

## (undated) DEVICE — PAD CAST SPECIALIST STRL 6

## (undated) DEVICE — GLOVE BIOGEL SKINSENSE PI 7.0

## (undated) DEVICE — SEE MEDLINE ITEM 152523

## (undated) DEVICE — BLADE SAG DUAL 18MMX1.27MMX90M

## (undated) DEVICE — UNDERGLOVE BIOGEL PI SZ 6.5 LF

## (undated) DEVICE — SEE MEDLINE ITEM 152530

## (undated) DEVICE — SEE MEDLINE ITEM 152622

## (undated) DEVICE — KIT TOTAL HIP OPTIVAC

## (undated) DEVICE — COVER BACK TABLE 72X21

## (undated) DEVICE — GLOVE BIOGEL SKINSENSE PI 8.5

## (undated) DEVICE — KIT IASSIST KNEE 2-POD

## (undated) DEVICE — SUT VICRYL+ 1 CTX 18IN VIOL

## (undated) DEVICE — SEALER BIPOLAR TISSUE 6.0

## (undated) DEVICE — SYR 30CC LUER LOCK

## (undated) DEVICE — TRAY FOLEY 16FR INFECTION CONT

## (undated) DEVICE — DRESSING XEROFORM FOIL PK 1X8

## (undated) DEVICE — GAUZE SPONGE 4X4 12PLY

## (undated) DEVICE — KIT IRR SUCTION HND PIECE

## (undated) DEVICE — TOURNIQUET SB QC SP 34X4IN

## (undated) DEVICE — KIT EVACUATOR 3-SPRING 1/8 DRN

## (undated) DEVICE — SEE MEDLINE ITEM 153151

## (undated) DEVICE — UNDERGLOVES BIOGEL PI SIZE 8.5

## (undated) DEVICE — GAUZE SPONGE 4'X4 12 PLY

## (undated) DEVICE — Device

## (undated) DEVICE — SUT VICRYL PLUS 2-0 CT1 18

## (undated) DEVICE — STAPLER SKIN PROXIMATE WIDE

## (undated) DEVICE — PAD COLD THERAPY KNEE WRAP ON

## (undated) DEVICE — DRESSING XEROFORM 1X8IN

## (undated) DEVICE — SEE MEDLINE ITEM 146271

## (undated) DEVICE — SOL NACL 0.9% INJ 500ML BG

## (undated) DEVICE — GLOVE BIOGEL SKINSENSE PI 6.5

## (undated) DEVICE — UNDERGLOVES BIOGEL PI SZ 7 LF

## (undated) DEVICE — BLADE RMR PATELLA 35MM

## (undated) DEVICE — SOL IRR NACL .9% 3000ML